# Patient Record
Sex: MALE | Race: WHITE | NOT HISPANIC OR LATINO
[De-identification: names, ages, dates, MRNs, and addresses within clinical notes are randomized per-mention and may not be internally consistent; named-entity substitution may affect disease eponyms.]

---

## 2017-12-30 ENCOUNTER — TRANSCRIPTION ENCOUNTER (OUTPATIENT)
Age: 77
End: 2017-12-30

## 2020-12-07 ENCOUNTER — TRANSCRIPTION ENCOUNTER (OUTPATIENT)
Age: 80
End: 2020-12-07

## 2023-07-07 PROBLEM — Z00.00 ENCOUNTER FOR PREVENTIVE HEALTH EXAMINATION: Status: ACTIVE | Noted: 2023-07-07

## 2023-07-10 ENCOUNTER — APPOINTMENT (OUTPATIENT)
Dept: NEUROLOGY | Facility: CLINIC | Age: 83
End: 2023-07-10
Payer: MEDICARE

## 2023-07-10 VITALS
DIASTOLIC BLOOD PRESSURE: 78 MMHG | SYSTOLIC BLOOD PRESSURE: 140 MMHG | HEIGHT: 68 IN | BODY MASS INDEX: 23.64 KG/M2 | HEART RATE: 67 BPM | WEIGHT: 156 LBS

## 2023-07-10 DIAGNOSIS — N40.0 BENIGN PROSTATIC HYPERPLASIA WITHOUT LOWER URINARY TRACT SYMPMS: ICD-10-CM

## 2023-07-10 DIAGNOSIS — M10.9 GOUT, UNSPECIFIED: ICD-10-CM

## 2023-07-10 DIAGNOSIS — K21.9 GASTRO-ESOPHAGEAL REFLUX DISEASE W/OUT ESOPHAGITIS: ICD-10-CM

## 2023-07-10 PROCEDURE — 99205 OFFICE O/P NEW HI 60 MIN: CPT

## 2023-07-10 RX ORDER — TAMSULOSIN HYDROCHLORIDE 0.4 MG/1
0.4 CAPSULE ORAL
Qty: 30 | Refills: 2 | Status: ACTIVE | COMMUNITY
Start: 2023-07-10

## 2023-07-10 RX ORDER — ALLOPURINOL 300 MG/1
300 TABLET ORAL DAILY
Refills: 0 | Status: ACTIVE | COMMUNITY
Start: 2023-07-10

## 2023-07-10 RX ORDER — FAMOTIDINE 20 MG/1
20 TABLET, FILM COATED ORAL DAILY
Refills: 0 | Status: ACTIVE | COMMUNITY
Start: 2023-07-10

## 2023-07-12 ENCOUNTER — OUTPATIENT (OUTPATIENT)
Dept: OUTPATIENT SERVICES | Facility: HOSPITAL | Age: 83
LOS: 1 days | End: 2023-07-12
Payer: MEDICARE

## 2023-07-12 ENCOUNTER — APPOINTMENT (OUTPATIENT)
Dept: MRI IMAGING | Facility: HOSPITAL | Age: 83
End: 2023-07-12
Payer: MEDICARE

## 2023-07-12 DIAGNOSIS — G31.84 MILD COGNITIVE IMPAIRMENT OF UNCERTAIN OR UNKNOWN ETIOLOGY: ICD-10-CM

## 2023-07-12 PROCEDURE — 70551 MRI BRAIN STEM W/O DYE: CPT | Mod: 26,MH

## 2023-07-12 PROCEDURE — 70551 MRI BRAIN STEM W/O DYE: CPT

## 2023-08-15 ENCOUNTER — APPOINTMENT (OUTPATIENT)
Dept: NEUROLOGY | Facility: CLINIC | Age: 83
End: 2023-08-15
Payer: MEDICARE

## 2023-08-15 PROCEDURE — 96136 PSYCL/NRPSYC TST PHY/QHP 1ST: CPT

## 2023-08-15 PROCEDURE — 96137 PSYCL/NRPSYC TST PHY/QHP EA: CPT

## 2023-08-15 PROCEDURE — 96116 NUBHVL XM PHYS/QHP 1ST HR: CPT

## 2023-08-15 PROCEDURE — 96133 NRPSYC TST EVAL PHYS/QHP EA: CPT

## 2023-08-15 PROCEDURE — 96132 NRPSYC TST EVAL PHYS/QHP 1ST: CPT

## 2023-09-05 ENCOUNTER — APPOINTMENT (OUTPATIENT)
Dept: NEUROLOGY | Facility: CLINIC | Age: 83
End: 2023-09-05
Payer: MEDICARE

## 2023-09-05 ENCOUNTER — APPOINTMENT (OUTPATIENT)
Dept: NEUROLOGY | Facility: CLINIC | Age: 83
End: 2023-09-05

## 2023-09-05 PROCEDURE — 96133 NRPSYC TST EVAL PHYS/QHP EA: CPT

## 2023-09-05 NOTE — HISTORY OF PRESENT ILLNESS
[FreeTextEntry1] : COVID - -mild, Paxlovid, AE of diarrhea, DC, got better any way.\par COVID VACCINE FULL.\par \par \par HPI: 83yo LH WM with BPH, gout, here for concerns of memory loss.\par \par PMH: For about 1y, he has had issues wiith STM, names, recent conversations, appts.\par Per wife, there is also some confusion, e.g. not fully know where and when to get things done. There may be some anxiety in some settings, contributing to the memory issue.\par \par -Memory: STM, names, events, appts\par -Speech: ok, no issues\par -Orientation: mostly ok, some sporadic difficulties\par -Praxis: mostly ok\par -Decision making/Executive fx/Multitasking: ok\par \par -Sleep:fair, a bit fragmented; some naps, can be long; no major snoring, deep breathing, no gasping\par \par -Appetite: ok\par \par -Motor symptoms: ok\par \par -B/B: frequency\par \par -Psychiatric symptoms: ok, but per wife, there can be some anxiety in certain settings, he tends to anticipate conversations\par \par -Functional status:\par [place] of [place] in Activities of Daily Living:\par 1. Bathing/Showerin\par 2. Dressin\par 3. Toiletin\par 4. Transferrin\par 5. Continence: 1\par 6: Feedin\par \par TOTAL: 6\par \par [place] Instrumental Activities of Daily Living:\par A. Ability to Use Telephone: 1\par B. Shoppin\par C. Food Preparation: 1\par D. Housekeepin\par E. Laundry: 1\par F. Transportation: 1\par G. Responsibility for Own Medications: 1\par H: Ability to Handle Finances: 1\par \par TOTAL: 8\par \par CDR: 0.5\par \par -Professional status: \par \par PCP and other physicians:\par -PCP:\par Vladislav Fernandez MD\par Internist in [place], [place]\par Get online care: [facility]\par Address: [address], [place] [address]\par Phone: [phone]\par \par Workup done:\par none.

## 2023-09-05 NOTE — PHYSICAL EXAM
[General Appearance - Alert] : alert [General Appearance - In No Acute Distress] : in no acute distress [Oriented To Time, Place, And Person] : oriented to person, place, and time [Impaired Insight] : insight and judgment were intact [Affect] : the affect was normal [Person] : oriented to person [Place] : oriented to place [Time] : oriented to time [Remote Intact] : remote memory intact [Registration Intact] : recent registration memory intact [Concentration Intact] : normal concentrating ability [Visual Intact] : visual attention was ~T not ~L decreased [Naming Objects] : no difficulty naming common objects [Repeating Phrases] : no difficulty repeating a phrase [Writing A Sentence] : no difficulty writing a sentence [Fluency] : fluency intact [Comprehension] : comprehension intact [Reading] : reading intact [Current Events] : adequate knowledge of current events [Past History] : adequate knowledge of personal past history [Vocabulary] : adequate range of vocabulary [Total Score ___ / 30] : the patient achieved a score of [unfilled] /30 [Date / Time ___ / 5] : date / time [unfilled] / 5 [Place ___ / 5] : place [unfilled] / 5 [Registration ___ / 3] : registration [unfilled] / 3 [Serial Sevens ___/5] : serial sevens [unfilled] / 5 [Naming 2 Objects ___ / 2] : naming two objects [unfilled] / 2 [Repeating a Sentence ___ / 1] : repeating a sentence [unfilled] / 1 [Writing a Sentence ___ / 1] : write sentence [unfilled] / 1 [3-stage Verbal Command ___ / 3] : three-stage verbal command [unfilled] / 3 [Written Command ___ / 1] : written command [unfilled] / 1 [Copy a Design ___ / 1] : copy a design [unfilled] / 1 [Recall ___ / 3] : recall [unfilled] / 3 [___ / 5] : Visuospatial / Executive: [unfilled] / 5 [0 / 0] : Memory: 0 / 0 [___ / 3] : Attention (Serial 7 subtraction): [unfilled] / 3 [___ / 1] : Fluency: [unfilled] / 1 [___ / 2] : Abstraction: [unfilled] / 2 [___ / 5] : Delayed Recall: [unfilled] / 5 [___ / 6] : Orientation: [unfilled] / 6 [Cranial Nerves Optic (II)] : visual acuity intact bilaterally,  visual fields full to confrontation, pupils equal round and reactive to light [Cranial Nerves Oculomotor (III)] : extraocular motion intact [Cranial Nerves Trigeminal (V)] : facial sensation intact symmetrically [Cranial Nerves Facial (VII)] : face symmetrical [Cranial Nerves Vestibulocochlear (VIII)] : hearing was intact bilaterally [Cranial Nerves Glossopharyngeal (IX)] : tongue and palate midline [Cranial Nerves Accessory (XI - Cranial And Spinal)] : head turning and shoulder shrug symmetric [Cranial Nerves Hypoglossal (XII)] : there was no tongue deviation with protrusion [Motor Tone] : muscle tone was normal in all four extremities [Motor Strength] : muscle strength was normal in all four extremities [Involuntary Movements] : no involuntary movements were seen [No Muscle Atrophy] : normal bulk in all four extremities [Motor Handedness Left-Handed] : the patient is left hand dominant [Sensation Tactile Decrease] : light touch was intact [Romberg's Sign] : a positive Romberg's sign was present [Balance] : balance was intact [2+] : Ankle jerk left 2+ [Sclera] : the sclera and conjunctiva were normal [PERRL With Normal Accommodation] : pupils were equal in size, round, reactive to light, with normal accommodation [Extraocular Movements] : extraocular movements were intact [No APD] : no afferent pupillary defect [No YELENA] : no internuclear ophthalmoplegia [Full Visual Field] : full visual field [Outer Ear] : the ears and nose were normal in appearance [Neck Appearance] : the appearance of the neck was normal [Edema] : there was no peripheral edema [Abnormal Walk] : normal gait [] : no rash [Motor Strength Upper Extremities Bilaterally] : strength was normal in both upper extremities [Motor Strength Lower Extremities Bilaterally] : strength was normal in both lower extremities [Limited Balance] : balance was intact [Past-pointing] : there was no past-pointing [Tremor] : no tremor present [Plantar Reflex Right Only] : normal on the right [Plantar Reflex Left Only] : normal on the left [FreeTextEntry4] : Mental Status Exam Presidents: 3/5 Alternating Pattern: ok Spiral: ok Clock: 3/3 Repetition: ok  R/L discrimination on self and examiner: ok Cross-line commands: ok Praxis: ok -Motor: ok -Dynamic/Luria: ok -Ideomotor/Imitation: ok  -Ideational/writing/closing-in: ok -Dressing:ok. [MocaTotal] : 28

## 2023-09-05 NOTE — ASSESSMENT
[FreeTextEntry1] : Assessment:\par 83yo RH WM with no significant PMH, here with forgetfulness.\par Mostly amnestic profile, MCI.\par Some anxiety, interfering with recall.\par \par Diagnostic Impression:\par -aMCI\par -anxiety\par \par Plan:\par Rule out reversible and vascular causes:\par -B vitamins, TFT, RPR\par -MRI brain w/o jessica\par -basic inflammatory labs\par -Lyme serology\par -NPT-Brief.\par Can consider SSRI in the near future as well.\par

## 2024-02-09 ENCOUNTER — NON-APPOINTMENT (OUTPATIENT)
Age: 84
End: 2024-02-09

## 2024-04-10 ENCOUNTER — INPATIENT (INPATIENT)
Facility: HOSPITAL | Age: 84
LOS: 1 days | Discharge: HOME | End: 2024-04-12
Attending: HOSPITALIST | Admitting: HOSPITALIST
Payer: MEDICARE

## 2024-04-10 VITALS
DIASTOLIC BLOOD PRESSURE: 56 MMHG | TEMPERATURE: 100 F | OXYGEN SATURATION: 100 % | SYSTOLIC BLOOD PRESSURE: 93 MMHG | RESPIRATION RATE: 18 BRPM | HEART RATE: 98 BPM

## 2024-04-10 DIAGNOSIS — M10.9 GOUT, UNSPECIFIED: ICD-10-CM

## 2024-04-10 DIAGNOSIS — Z87.438 PERSONAL HISTORY OF OTHER DISEASES OF MALE GENITAL ORGANS: ICD-10-CM

## 2024-04-10 DIAGNOSIS — R17 UNSPECIFIED JAUNDICE: ICD-10-CM

## 2024-04-10 DIAGNOSIS — R94.31 ABNORMAL ELECTROCARDIOGRAM [ECG] [EKG]: ICD-10-CM

## 2024-04-10 DIAGNOSIS — Z86.59 PERSONAL HISTORY OF OTHER MENTAL AND BEHAVIORAL DISORDERS: ICD-10-CM

## 2024-04-10 DIAGNOSIS — A41.9 SEPSIS, UNSPECIFIED ORGANISM: ICD-10-CM

## 2024-04-10 DIAGNOSIS — J18.9 PNEUMONIA, UNSPECIFIED ORGANISM: ICD-10-CM

## 2024-04-10 DIAGNOSIS — Z98.890 OTHER SPECIFIED POSTPROCEDURAL STATES: Chronic | ICD-10-CM

## 2024-04-10 DIAGNOSIS — R11.2 NAUSEA WITH VOMITING, UNSPECIFIED: ICD-10-CM

## 2024-04-10 DIAGNOSIS — Z29.9 ENCOUNTER FOR PROPHYLACTIC MEASURES, UNSPECIFIED: ICD-10-CM

## 2024-04-10 DIAGNOSIS — K92.0 HEMATEMESIS: ICD-10-CM

## 2024-04-10 LAB
ALBUMIN SERPL ELPH-MCNC: 3.6 G/DL — SIGNIFICANT CHANGE UP (ref 3.3–5)
ALP SERPL-CCNC: 67 U/L — SIGNIFICANT CHANGE UP (ref 40–120)
ALT FLD-CCNC: 19 U/L — SIGNIFICANT CHANGE UP (ref 4–41)
ANION GAP SERPL CALC-SCNC: 11 MMOL/L — SIGNIFICANT CHANGE UP (ref 7–14)
APPEARANCE UR: CLEAR — SIGNIFICANT CHANGE UP
APTT BLD: 24.2 SEC — LOW (ref 24.5–35.6)
AST SERPL-CCNC: 27 U/L — SIGNIFICANT CHANGE UP (ref 4–40)
BACTERIA # UR AUTO: NEGATIVE /HPF — SIGNIFICANT CHANGE UP
BASOPHILS # BLD AUTO: 0 K/UL — SIGNIFICANT CHANGE UP (ref 0–0.2)
BASOPHILS NFR BLD AUTO: 0 % — SIGNIFICANT CHANGE UP (ref 0–2)
BILIRUB SERPL-MCNC: 1.8 MG/DL — HIGH (ref 0.2–1.2)
BILIRUB UR-MCNC: NEGATIVE — SIGNIFICANT CHANGE UP
BLD GP AB SCN SERPL QL: NEGATIVE — SIGNIFICANT CHANGE UP
BLOOD GAS VENOUS COMPREHENSIVE RESULT: SIGNIFICANT CHANGE UP
BLOOD GAS VENOUS COMPREHENSIVE RESULT: SIGNIFICANT CHANGE UP
BUN SERPL-MCNC: 18 MG/DL — SIGNIFICANT CHANGE UP (ref 7–23)
CALCIUM SERPL-MCNC: 8.4 MG/DL — SIGNIFICANT CHANGE UP (ref 8.4–10.5)
CAST: 0 /LPF — SIGNIFICANT CHANGE UP (ref 0–4)
CHLORIDE SERPL-SCNC: 102 MMOL/L — SIGNIFICANT CHANGE UP (ref 98–107)
CO2 SERPL-SCNC: 21 MMOL/L — LOW (ref 22–31)
COLOR SPEC: YELLOW — SIGNIFICANT CHANGE UP
CREAT SERPL-MCNC: 1.14 MG/DL — SIGNIFICANT CHANGE UP (ref 0.5–1.3)
DIFF PNL FLD: NEGATIVE — SIGNIFICANT CHANGE UP
EGFR: 64 ML/MIN/1.73M2 — SIGNIFICANT CHANGE UP
EOSINOPHIL # BLD AUTO: 0 K/UL — SIGNIFICANT CHANGE UP (ref 0–0.5)
EOSINOPHIL NFR BLD AUTO: 0 % — SIGNIFICANT CHANGE UP (ref 0–6)
FLUAV AG NPH QL: SIGNIFICANT CHANGE UP
FLUBV AG NPH QL: SIGNIFICANT CHANGE UP
GLUCOSE SERPL-MCNC: 136 MG/DL — HIGH (ref 70–99)
GLUCOSE UR QL: NEGATIVE MG/DL — SIGNIFICANT CHANGE UP
HCT VFR BLD CALC: 39.9 % — SIGNIFICANT CHANGE UP (ref 39–50)
HGB BLD-MCNC: 13.7 G/DL — SIGNIFICANT CHANGE UP (ref 13–17)
IANC: 12.35 K/UL — HIGH (ref 1.8–7.4)
INR BLD: 1.14 RATIO — SIGNIFICANT CHANGE UP (ref 0.85–1.18)
KETONES UR-MCNC: ABNORMAL MG/DL
LEUKOCYTE ESTERASE UR-ACNC: NEGATIVE — SIGNIFICANT CHANGE UP
LIDOCAIN IGE QN: 19 U/L — SIGNIFICANT CHANGE UP (ref 7–60)
LYMPHOCYTES # BLD AUTO: 0.35 K/UL — LOW (ref 1–3.3)
LYMPHOCYTES # BLD AUTO: 2.6 % — LOW (ref 13–44)
MCHC RBC-ENTMCNC: 32.1 PG — SIGNIFICANT CHANGE UP (ref 27–34)
MCHC RBC-ENTMCNC: 34.3 GM/DL — SIGNIFICANT CHANGE UP (ref 32–36)
MCV RBC AUTO: 93.4 FL — SIGNIFICANT CHANGE UP (ref 80–100)
MONOCYTES # BLD AUTO: 1.18 K/UL — HIGH (ref 0–0.9)
MONOCYTES NFR BLD AUTO: 8.7 % — SIGNIFICANT CHANGE UP (ref 2–14)
NEUTROPHILS # BLD AUTO: 12.04 K/UL — HIGH (ref 1.8–7.4)
NEUTROPHILS NFR BLD AUTO: 79.1 % — HIGH (ref 43–77)
NITRITE UR-MCNC: NEGATIVE — SIGNIFICANT CHANGE UP
OB PNL STL: NEGATIVE — SIGNIFICANT CHANGE UP
PH UR: 6 — SIGNIFICANT CHANGE UP (ref 5–8)
PLATELET # BLD AUTO: 155 K/UL — SIGNIFICANT CHANGE UP (ref 150–400)
POTASSIUM SERPL-MCNC: 4.2 MMOL/L — SIGNIFICANT CHANGE UP (ref 3.5–5.3)
POTASSIUM SERPL-SCNC: 4.2 MMOL/L — SIGNIFICANT CHANGE UP (ref 3.5–5.3)
PROT SERPL-MCNC: 6.2 G/DL — SIGNIFICANT CHANGE UP (ref 6–8.3)
PROT UR-MCNC: 30 MG/DL
PROTHROM AB SERPL-ACNC: 12.8 SEC — SIGNIFICANT CHANGE UP (ref 9.5–13)
RBC # BLD: 4.27 M/UL — SIGNIFICANT CHANGE UP (ref 4.2–5.8)
RBC # FLD: 13 % — SIGNIFICANT CHANGE UP (ref 10.3–14.5)
RBC CASTS # UR COMP ASSIST: 3 /HPF — SIGNIFICANT CHANGE UP (ref 0–4)
RH IG SCN BLD-IMP: POSITIVE — SIGNIFICANT CHANGE UP
RSV RNA NPH QL NAA+NON-PROBE: SIGNIFICANT CHANGE UP
SARS-COV-2 RNA SPEC QL NAA+PROBE: SIGNIFICANT CHANGE UP
SODIUM SERPL-SCNC: 134 MMOL/L — LOW (ref 135–145)
SP GR SPEC: 1.09 — HIGH (ref 1–1.03)
SQUAMOUS # UR AUTO: 1 /HPF — SIGNIFICANT CHANGE UP (ref 0–5)
UROBILINOGEN FLD QL: 1 MG/DL — SIGNIFICANT CHANGE UP (ref 0.2–1)
WBC # BLD: 13.57 K/UL — HIGH (ref 3.8–10.5)
WBC # FLD AUTO: 13.57 K/UL — HIGH (ref 3.8–10.5)
WBC UR QL: 1 /HPF — SIGNIFICANT CHANGE UP (ref 0–5)

## 2024-04-10 PROCEDURE — 71045 X-RAY EXAM CHEST 1 VIEW: CPT | Mod: 26

## 2024-04-10 PROCEDURE — 99223 1ST HOSP IP/OBS HIGH 75: CPT

## 2024-04-10 PROCEDURE — 99285 EMERGENCY DEPT VISIT HI MDM: CPT | Mod: GC

## 2024-04-10 PROCEDURE — 74177 CT ABD & PELVIS W/CONTRAST: CPT | Mod: 26,MC

## 2024-04-10 RX ORDER — FAMOTIDINE 10 MG/ML
1 INJECTION INTRAVENOUS
Refills: 0 | DISCHARGE

## 2024-04-10 RX ORDER — PIPERACILLIN AND TAZOBACTAM 4; .5 G/20ML; G/20ML
3.38 INJECTION, POWDER, LYOPHILIZED, FOR SOLUTION INTRAVENOUS ONCE
Refills: 0 | Status: DISCONTINUED | OUTPATIENT
Start: 2024-04-11 | End: 2024-04-11

## 2024-04-10 RX ORDER — ALLOPURINOL 300 MG
0 TABLET ORAL
Refills: 0 | DISCHARGE

## 2024-04-10 RX ORDER — SODIUM CHLORIDE 9 MG/ML
1000 INJECTION INTRAMUSCULAR; INTRAVENOUS; SUBCUTANEOUS ONCE
Refills: 0 | Status: COMPLETED | OUTPATIENT
Start: 2024-04-10 | End: 2024-04-10

## 2024-04-10 RX ORDER — PIPERACILLIN AND TAZOBACTAM 4; .5 G/20ML; G/20ML
3.38 INJECTION, POWDER, LYOPHILIZED, FOR SOLUTION INTRAVENOUS ONCE
Refills: 0 | Status: COMPLETED | OUTPATIENT
Start: 2024-04-11 | End: 2024-04-11

## 2024-04-10 RX ORDER — PIPERACILLIN AND TAZOBACTAM 4; .5 G/20ML; G/20ML
3.38 INJECTION, POWDER, LYOPHILIZED, FOR SOLUTION INTRAVENOUS EVERY 8 HOURS
Refills: 0 | Status: DISCONTINUED | OUTPATIENT
Start: 2024-04-11 | End: 2024-04-11

## 2024-04-10 RX ORDER — PANTOPRAZOLE SODIUM 20 MG/1
40 TABLET, DELAYED RELEASE ORAL ONCE
Refills: 0 | Status: DISCONTINUED | OUTPATIENT
Start: 2024-04-10 | End: 2024-04-12

## 2024-04-10 RX ORDER — TAMSULOSIN HYDROCHLORIDE 0.4 MG/1
0.8 CAPSULE ORAL AT BEDTIME
Refills: 0 | Status: DISCONTINUED | OUTPATIENT
Start: 2024-04-10 | End: 2024-04-12

## 2024-04-10 RX ORDER — ACETAMINOPHEN 500 MG
975 TABLET ORAL ONCE
Refills: 0 | Status: COMPLETED | OUTPATIENT
Start: 2024-04-10 | End: 2024-04-10

## 2024-04-10 RX ORDER — PANTOPRAZOLE SODIUM 20 MG/1
40 TABLET, DELAYED RELEASE ORAL DAILY
Refills: 0 | Status: DISCONTINUED | OUTPATIENT
Start: 2024-04-11 | End: 2024-04-12

## 2024-04-10 RX ORDER — PIPERACILLIN AND TAZOBACTAM 4; .5 G/20ML; G/20ML
3.38 INJECTION, POWDER, LYOPHILIZED, FOR SOLUTION INTRAVENOUS EVERY 8 HOURS
Refills: 0 | Status: DISCONTINUED | OUTPATIENT
Start: 2024-04-10 | End: 2024-04-10

## 2024-04-10 RX ORDER — ACETAMINOPHEN 500 MG
650 TABLET ORAL EVERY 6 HOURS
Refills: 0 | Status: DISCONTINUED | OUTPATIENT
Start: 2024-04-10 | End: 2024-04-12

## 2024-04-10 RX ORDER — AMPICILLIN SODIUM AND SULBACTAM SODIUM 250; 125 MG/ML; MG/ML
3 INJECTION, POWDER, FOR SUSPENSION INTRAMUSCULAR; INTRAVENOUS ONCE
Refills: 0 | Status: COMPLETED | OUTPATIENT
Start: 2024-04-10 | End: 2024-04-10

## 2024-04-10 RX ORDER — PIPERACILLIN AND TAZOBACTAM 4; .5 G/20ML; G/20ML
3.38 INJECTION, POWDER, LYOPHILIZED, FOR SOLUTION INTRAVENOUS ONCE
Refills: 0 | Status: COMPLETED | OUTPATIENT
Start: 2024-04-10 | End: 2024-04-10

## 2024-04-10 RX ORDER — AZITHROMYCIN 500 MG/1
500 TABLET, FILM COATED ORAL ONCE
Refills: 0 | Status: COMPLETED | OUTPATIENT
Start: 2024-04-10 | End: 2024-04-10

## 2024-04-10 RX ORDER — ESCITALOPRAM OXALATE 10 MG/1
1 TABLET, FILM COATED ORAL
Refills: 0 | DISCHARGE

## 2024-04-10 RX ORDER — ALLOPURINOL 300 MG
100 TABLET ORAL AT BEDTIME
Refills: 0 | Status: DISCONTINUED | OUTPATIENT
Start: 2024-04-10 | End: 2024-04-12

## 2024-04-10 RX ADMIN — Medication 975 MILLIGRAM(S): at 16:20

## 2024-04-10 RX ADMIN — AMPICILLIN SODIUM AND SULBACTAM SODIUM 200 GRAM(S): 250; 125 INJECTION, POWDER, FOR SUSPENSION INTRAMUSCULAR; INTRAVENOUS at 16:50

## 2024-04-10 RX ADMIN — SODIUM CHLORIDE 1000 MILLILITER(S): 9 INJECTION INTRAMUSCULAR; INTRAVENOUS; SUBCUTANEOUS at 15:56

## 2024-04-10 RX ADMIN — SODIUM CHLORIDE 1000 MILLILITER(S): 9 INJECTION INTRAMUSCULAR; INTRAVENOUS; SUBCUTANEOUS at 14:18

## 2024-04-10 RX ADMIN — AZITHROMYCIN 255 MILLIGRAM(S): 500 TABLET, FILM COATED ORAL at 17:36

## 2024-04-10 RX ADMIN — Medication 975 MILLIGRAM(S): at 15:56

## 2024-04-10 NOTE — H&P ADULT - NSICDXPASTMEDICALHX_GEN_ALL_CORE_FT
PAST MEDICAL HISTORY:  Diverticulosis     Gout     History of BPH     History of IBS     Mild anxiety

## 2024-04-10 NOTE — ED PROVIDER NOTE - CLINICAL SUMMARY MEDICAL DECISION MAKING FREE TEXT BOX
Norm - Patient is an 83-year-old male with history of IBS who presents to the ED with abdominal pain, nausea, vomiting. ddx includes but is not limited to diverticulitis vs upper/lower GI bleed vs anemia. will check labs, ekg, CT abd pelv, cxr. fluids and tylenol for fever and pain

## 2024-04-10 NOTE — H&P ADULT - NSHPLABSRESULTS_GEN_ALL_CORE
Personally reviewed old records.  Personally reviewed labs.  Personally reviewed imaging.  Personally reviewed EKG.                          13.7   13.57 )-----------( 155      ( 10 Apr 2024 14:32 )             39.9       04-10    134<L>  |  102  |  18  ----------------------------<  136<H>  4.2   |  21<L>  |  1.14    Ca    8.4      10 Apr 2024 14:32    TPro  6.2  /  Alb  3.6  /  TBili  1.8<H>  /  DBili  x   /  AST  27  /  ALT  19  /  AlkPhos  67  04-10            LIVER FUNCTIONS - ( 10 Apr 2024 14:32 )  Alb: 3.6 g/dL / Pro: 6.2 g/dL / ALK PHOS: 67 U/L / ALT: 19 U/L / AST: 27 U/L / GGT: x             PT/INR - ( 10 Apr 2024 14:32 )   PT: 12.8 sec;   INR: 1.14 ratio         PTT - ( 10 Apr 2024 14:32 )  PTT:24.2 sec    Urinalysis Basic - ( 10 Apr 2024 14:32 )    Color: x / Appearance: x / SG: x / pH: x  Gluc: 136 mg/dL / Ketone: x  / Bili: x / Urobili: x   Blood: x / Protein: x / Nitrite: x   Leuk Esterase: x / RBC: x / WBC x   Sq Epi: x / Non Sq Epi: x / Bacteria: x .    -Personally INTERPRETED EKG:     -Personally INTERPRETED CXR: right basilar perihilar opacity, no PTX, no pleural effusions     -Personally reviewed the following labs below:                         13.7   13.57 )-----------( 155      ( 10 Apr 2024 14:32 )             39.9     04-10    134<L>  |  102  |  18  ----------------------------<  136<H>  4.2   |  21<L>  |  1.14    Ca    8.4      10 Apr 2024 14:32    TPro  6.2  /  Alb  3.6  /  TBili  1.8<H>  /  DBili  x   /  AST  27  /  ALT  19  /  AlkPhos  67  04-10      LIVER FUNCTIONS - ( 10 Apr 2024 14:32 )  Alb: 3.6 g/dL / Pro: 6.2 g/dL / ALK PHOS: 67 U/L / ALT: 19 U/L / AST: 27 U/L / GGT: x           PT/INR - ( 10 Apr 2024 14:32 )   PT: 12.8 sec;   INR: 1.14 ratio    PTT - ( 10 Apr 2024 14:32 )  PTT:24.2 sec    Urinalysis Basic - ( 10 Apr 2024 14:32 )  Color: x / Appearance: x / SG: x / pH: x  Gluc: 136 mg/dL / Ketone: x  / Bili: x / Urobili: x   Blood: x / Protein: x / Nitrite: x   Leuk Esterase: x / RBC: x / WBC x   Sq Epi: x / Non Sq Epi: x / Bacteria: x      -Personally reviewed: CT ABDOMEN AND PELVIS IC ORDERED BY: STELLA ENRIQUEZ    PROCEDURE DATE: 04/10/2024  FINDINGS:  LOWER CHEST: Right upper and middle lobe and left lower lobe heterogeneous infiltrates concerning for pneumonia. Trace left pleural effusion. Coronary artery calcification. Borderline cardiomegaly.    LIVER: Steatosis.  BILE DUCTS: Normal caliber.  GALLBLADDER: Within normal limits.  SPLEEN: Subcentimeter hypodense focus, indeterminate.  PANCREAS: Within normal limits.  ADRENALS: Within normal limits.  KIDNEYS/URETERS: Horseshoe kidney. No hydronephrosis.  BLADDER: Mural thickening.  REPRODUCTIVE ORGANS: Prostate is enlarged.  BOWEL: No bowel obstruction. Appendix is normal. Uncomplicated sigmoid diverticula.  PERITONEUM: No ascites. Small pockets of fluid in the pelvis bilaterally. Etiology undetermined.  VESSELS: Within normal limits.  RETROPERITONEUM/LYMPH NODES: No lymphadenopathy.  ABDOMINAL WALL: Lipoma right lateral abdominal wall, measuring 6.5 x 11.4 x 2 cm.  BONES: Degenerative changes.  IMPRESSION:  Bilateral pneumonia, right more than left.  Uncomplicated sigmoid diverticula.  Small foci of peritoneal fluid in the pelvic region, indeterminate.  Horseshoe kidney.  Enlarged prostate with chronic bladder outlet obstruction.  Additional findings as above. .    -Personally INTERPRETED EKG: NSR 86bpm, qtc 481, 1st deg AVB, no acute Tw or ST changes, no  PACs or PVCs     -Personally INTERPRETED CXR: right basilar perihilar opacity, no PTX, no pleural effusions     -Personally reviewed the following labs below:                         13.7   13.57 )-----------( 155      ( 10 Apr 2024 14:32 )             39.9     04-10    134<L>  |  102  |  18  ----------------------------<  136<H>  4.2   |  21<L>  |  1.14    Ca    8.4      10 Apr 2024 14:32    TPro  6.2  /  Alb  3.6  /  TBili  1.8<H>  /  DBili  x   /  AST  27  /  ALT  19  /  AlkPhos  67  04-10      LIVER FUNCTIONS - ( 10 Apr 2024 14:32 )  Alb: 3.6 g/dL / Pro: 6.2 g/dL / ALK PHOS: 67 U/L / ALT: 19 U/L / AST: 27 U/L / GGT: x           PT/INR - ( 10 Apr 2024 14:32 )   PT: 12.8 sec;   INR: 1.14 ratio    PTT - ( 10 Apr 2024 14:32 )  PTT:24.2 sec    Urinalysis Basic - ( 10 Apr 2024 14:32 )  Color: x / Appearance: x / SG: x / pH: x  Gluc: 136 mg/dL / Ketone: x  / Bili: x / Urobili: x   Blood: x / Protein: x / Nitrite: x   Leuk Esterase: x / RBC: x / WBC x   Sq Epi: x / Non Sq Epi: x / Bacteria: x      -Personally reviewed: CT ABDOMEN AND PELVIS IC ORDERED BY: STELLA ENRIQUEZ    PROCEDURE DATE: 04/10/2024  FINDINGS:  LOWER CHEST: Right upper and middle lobe and left lower lobe heterogeneous infiltrates concerning for pneumonia. Trace left pleural effusion. Coronary artery calcification. Borderline cardiomegaly.    LIVER: Steatosis.  BILE DUCTS: Normal caliber.  GALLBLADDER: Within normal limits.  SPLEEN: Subcentimeter hypodense focus, indeterminate.  PANCREAS: Within normal limits.  ADRENALS: Within normal limits.  KIDNEYS/URETERS: Horseshoe kidney. No hydronephrosis.  BLADDER: Mural thickening.  REPRODUCTIVE ORGANS: Prostate is enlarged.  BOWEL: No bowel obstruction. Appendix is normal. Uncomplicated sigmoid diverticula.  PERITONEUM: No ascites. Small pockets of fluid in the pelvis bilaterally. Etiology undetermined.  VESSELS: Within normal limits.  RETROPERITONEUM/LYMPH NODES: No lymphadenopathy.  ABDOMINAL WALL: Lipoma right lateral abdominal wall, measuring 6.5 x 11.4 x 2 cm.  BONES: Degenerative changes.  IMPRESSION:  Bilateral pneumonia, right more than left.  Uncomplicated sigmoid diverticula.  Small foci of peritoneal fluid in the pelvic region, indeterminate.  Horseshoe kidney.  Enlarged prostate with chronic bladder outlet obstruction.  Additional findings as above.

## 2024-04-10 NOTE — H&P ADULT - PROBLEM SELECTOR PLAN 8
diet: regular  dvt ppx: scds  dispo: pending clinical improvement    EKG with  QTc 505 with LBBB on EKG from admission, no prior EKG on record. Pt with no known cardiac hx. Pt denies chest pain, dyspnea, palpitations. Hemodynamically stable  -f/u repeat EKG  -avoid QT prolonging agents  -CTM LBBB on EKG from admission, QTc ~419. no prior EKG on record. Pt with no known cardiac hx. Pt denies chest pain, dyspnea, palpitations. Hemodynamically stable  -f/u repeat EKG  -CTM LBBB on EKG from admission, QTc ~419 (falsely elevated iso LBBB). no prior EKG on record. Pt with no known cardiac hx. Pt denies chest pain, dyspnea, palpitations. Hemodynamically stable  -f/u repeat EKG  -if LBBB present on repeat will assess cardiac enzymes Elevated Tbili 1.8 on admission with LFTs wnl. CTAP with liver steatosis. Possibly iatrogenic iso allopurinol use. No evidence of jaundice or other skin changes on exam  -trend cmp  -no plan for other intervention at this time will continue to monitor

## 2024-04-10 NOTE — H&P ADULT - PROBLEM SELECTOR PLAN 1
P/w  Met SIRS with leukocytosis with bands, fever and tachycardia, source of PNA meeting sepsis criteria. No significant imaging findings on CTAP    -f/u BCx, strep, legionella, flu/covid/rsv panel  -s/p Unasyn and Azithro in ED, c/w Zosyn (4/10 - ) P/w abd pain and N/V, now resolved. in ED met SIRS with leukocytosis with bands, fever and tachycardia, source of PNA thus meeting sepsis criteria. CXR/CT demonstrating multifocal pna otherwise no significant findings. Low suspicion for diverticulitis as pt abdomen nontender and no significant changes on CT  -UA and flu/covid/rsv negative  -f/u BCx, UCx, sputum cx, strep, legionella   -s/p Unasyn and Azithro in ED, c/w Zosyn (4/10 - ) and Doxycycline (4/11 - )  -trend cbc, monitor fever curve P/w abd pain and N/V, now resolved. in ED met SIRS with leukocytosis with bands, fever and tachycardia, source of PNA thus meeting sepsis criteria. CXR/CT demonstrating multifocal pna otherwise no significant findings. Low suspicion for diverticulitis or other abdominal source of infection as pt abdomen nontender and no significant changes on CT  -UA and flu/covid/rsv negative  -f/u BCx, UCx, sputum cx, strep, legionella   -s/p Unasyn and Azithro in ED, c/w Zosyn (4/10 - ) and Doxycycline (4/11 - )  -trend cbc, monitor fever curve P/w abd pain and N/V, now resolved. in ED met SIRS with leukocytosis with bands, fever and tachycardia, source of PNA thus meeting sepsis criteria. CXR/CT demonstrating multifocal pna otherwise no significant findings. Low suspicion for diverticulitis or other abdominal source of infection as pt abdomen nontender and no significant changes on CT  -UA and flu/covid/rsv negative  -f/u BCx, UCx, sputum cx, strep, legionella   -s/p Unasyn and Azithro in ED, c/w Zosyn (4/10 - ) and Azithro (4/10 - )  -trend cbc, monitor fever curve Xhrh=767.4, Tachycardia, UE=037i-75r; leukocytosis, WBC=13.57K  with bandemia 10%   Source: b/l PNA  CT A/P: Bilateral pneumonia, right more than left. Uncomplicated sigmoid diverticula. Small foci of peritoneal fluid in the pelvic region, indeterminate. Horseshoe kidney. Enlarged prostate with chronic bladder outlet obstruction.    -UA and flu/covid/rsv negative  -f/u BCx, UCx, sputum cx, strep, legionella   -s/p Unasyn and Azithro in ED  - Started Zosyn (4/10 - ) and Azithromycin IV (4/10 - )  -trend cbc, monitor fever curve

## 2024-04-10 NOTE — H&P ADULT - PROBLEM SELECTOR PLAN 4
diet: regular  dvt ppx:  dispo: pending clinical improvement  code: diet: regular  dvt ppx: scds  dispo: pending clinical improvement pt with hx of mild anxiety, on lexapro 10mg qd at home  -hold home lexapro iso prolonged qtc pt with hx of mild anxiety, on lexapro 10mg qd at home  -c/w home lexapro EKG: QTc 466-->481 after Azithromycin 1st dose      -Change Azithromycin IV --> Doxycycline IV due to QT prolongation   -Avoid QT prolonging agents

## 2024-04-10 NOTE — H&P ADULT - PROBLEM SELECTOR PLAN 5
CTAP demonstrating Enlarged prostate with chronic bladder outlet obstruction. Suspect pt's incontinence and frequency are iso known BPH. on tamsulosin 0.8mg qhs at home  -c/w home tamsulosin CTAP demonstrating Enlarged prostate with chronic bladder outlet obstruction. KIDNEYS/URETERS: Horseshoe kidney. No hydronephrosis. BLADDER: Mural thickening.     Suspect pt's incontinence and frequency are iso known BPH. on tamsulosin 0.8mg qhs at home    -c/w home Tamsulosin  -PVR in AM  ordered r/o retention   -f/u with PCP or  outpt pt with hx of mild anxiety, on lexapro 10mg qd at home  -c/w home lexapro pt with hx of mild anxiety, on lexapro 10mg qd at home  -hold lexapro iso qt prolongation

## 2024-04-10 NOTE — H&P ADULT - ATTENDING COMMENTS
83-year-old male with history of IBS, anxiety, gout a/w sepsis due to bilateral PNA, and abdominal pain with nausea, vomiting; r/o GIB, r/o urinary retention       Assessment and plan modified and supplemented where indicated;

## 2024-04-10 NOTE — ED ADULT NURSE REASSESSMENT NOTE - NS ED NURSE REASSESS COMMENT FT1
Report received from day RNStephanie. A&Ox4. Pt offers no complaints at this time, denies CP, palpitations, abdominal pain, SOB, nausea, vomiting, headache, lightheadedness, dizziness, fever or chills. Well appearing sitting up in stretcher doing a puzzle. HOB elevated. NSR on bedside cardiac monitor. Vital signs obtained and charted. Respirations even and unlabored with equal chest rise. No acute distress noted. Airway patent, speaking in full and complete sentences. Family at bedside. Bed in lowest position, in vision of nurses station in spot 3B, wheels locked, fall precautions in effect, safety maintained. Report given to [id number] RNKailey.

## 2024-04-10 NOTE — H&P ADULT - PROBLEM SELECTOR PLAN 2
-abx management per above  -labs per above  -Goal SpO2 >92%, currently on RA, can initiate supplemental O2 as needed CTAP with Bilateral pneumonia, right more than left.  -abx management per above  -labs per above  -Goal SpO2 >92%, currently on RA, can initiate supplemental O2 as needed CTAP with Bilateral pneumonia, right more than left.  C/F aspiration PNA    -Abx: Zosyn IV, Azithromycin IV   -f/u BCx, UCx, sputum cx, strep, legionella   -labs per above  -Goal SpO2 >92%, currently on RA, can initiate supplemental O2 as needed CTAP with Bilateral pneumonia, right more than left.  C/F aspiration PNA    -Abx: Zosyn IV, Azithromycin IV --> Doxycycline IV due to QT prolongation   -f/u BCx, UCx, sputum cx, strep, legionella   -labs per above  -Goal SpO2 >92%, currently on RA, can initiate supplemental O2 as needed

## 2024-04-10 NOTE — H&P ADULT - PROBLEM SELECTOR PLAN 3
-zofran q6 prn nausea Pt developed nausea with vomiting x5 this morning. Per records, pt noted to have "coffee-ground emesis" prior to coming to ED however pt and family state vomitus was brown. Denies black, bloody, or coffee ground appearance. His nausea has since resolved and he is no longer vomiting. Low suspicion for GIB at this time as hemodynamically stable, hgb stable, BUN wnl. Furthermore pt denies any melena or hematochezia.   -avoid zofran/reglan due to qtc prolongation  -c/w protonix 40mg qd  -monitor for further episodes of vomiting or signs of GIB  -if showing evidence for GIB would consult GI Pt developed nausea with vomiting x5 this morning. Per records, pt noted to have "coffee-ground emesis" prior to coming to ED however pt and family state vomitus was brown. Denies black, bloody, or coffee ground appearance. His nausea has since resolved and he is no longer vomiting. Low suspicion for GIB at this time as hemodynamically stable, hgb stable, BUN wnl. Furthermore pt denies any melena or hematochezia.   -zofran 4mg q6h prn nausea  -c/w protonix 40mg qd  -monitor for further episodes of vomiting or signs of GIB  -if showing evidence for GIB would consult GI Pt developed nausea with vomiting x5 this morning. Per records, pt noted to have "coffee-ground emesis" prior to coming to ED however pt and family state vomitus was brown. Denies black, bloody, or coffee ground appearance. His nausea has since resolved and he is no longer vomiting. Low suspicion for GIB at this time as hemodynamically stable, hgb stable, BUN wnl. Furthermore pt denies any melena or hematochezia.   -zofran 4mg q6h prn nausea  -c/w protonix 40mg qd  -monitor for further episodes of vomiting or signs of GIB  -if showing evidence for GIB would consult GI  -Hold Heparin sq for DVT ppx Pt developed nausea with vomiting x5 this morning. Per records, pt noted to have "coffee-ground emesis" prior to coming to ED however pt and family state vomitus was brown. Denies black, bloody, or coffee ground appearance. His nausea has since resolved and he is no longer vomiting. Low suspicion for GIB at this time as hemodynamically stable, hgb stable, BUN wnl. Furthermore pt denies any melena or hematochezia.   -would avoid zofran/reglan if possible due to qtc prolongation  -c/w protonix 40mg qd  -monitor for further episodes of vomiting or signs of GIB  -if showing evidence for GIB would consult GI  -Hold Heparin sq for DVT ppx

## 2024-04-10 NOTE — H&P ADULT - PROBLEM SELECTOR PLAN 9
diet: regular  dvt ppx: scds  dispo: pending clinical improvement LBBB on EKG from admission, QTc ~419 (falsely elevated iso LBBB). no prior EKG on record. Pt with no known cardiac hx. Pt denies chest pain, dyspnea, palpitations. Hemodynamically stable  -f/u repeat EKG  -if LBBB present on repeat will assess cardiac enzymes

## 2024-04-10 NOTE — ED ADULT NURSE NOTE - OBJECTIVE STATEMENT
Patient received to room 1 A&OX4, ambulatory at baseline, accompanied by family member coming to the ED brought by EMS for c/o multiple episodes having coffee ground emesis and diarrhea x 2 days. Patient endorsing generalized weakness an dizziness upon standing and quick movements. Endorsing mild nausea no vomiting at this time. Denies chest pain, sob, headache, blurry vision, abdominal pain. RR equal and unlabored. Placed on cardiac monitor. Abdomen soft, non-distended, tender on light palpation to the LLQ. Noted to be febrile at this time. Arrives with 18G IV placed by EMS to the L hand. 20G IV placed in the R AC, labs drawn and sent. Endorsing smoking marijuana occasionally and alcohol use x "few days a week". Care plan continued. Comfort measures provided. Safety maintained. Awaiting lab results and imaging.

## 2024-04-10 NOTE — H&P ADULT - PROBLEM SELECTOR PLAN 6
hx of gout flares, last flare years ago. on allopurinol qd at home  -c/w allopurinol 100mg qd  -monitor for sx CTAP demonstrating Enlarged prostate with chronic bladder outlet obstruction. KIDNEYS/URETERS: Horseshoe kidney. No hydronephrosis. BLADDER: Mural thickening.     Suspect pt's incontinence and frequency are iso known BPH. on tamsulosin 0.8mg qhs at home    -c/w home Tamsulosin  -PVR in AM  ordered r/o retention   -f/u with PCP or  outpt

## 2024-04-10 NOTE — H&P ADULT - NSHPREVIEWOFSYSTEMS_GEN_ALL_CORE
REVIEW OF SYSTEMS:  CONSTITUTIONAL: No weakness. No fevers. No chills.   EYES: No blurry vision. No eye pain.  ENT/NECK:No dysphagia. No sore throat. No Sinusitis/rhinorrhea.  CARDIAC: No chest pain. No palpitations. No lightheadedness.   RESPIRATORY: No cough. No SOB.  GASTROINTESTINAL: +abdominal pain. +nausea. +vomiting. No diarrhea. No constipation  GENITOURINARY: No dysuria. No frequency.   NEUROLOGICAL: No numbness/tingling. No focal weakness. No headache.  BACK: No back pain. No flank pain.  EXTREMITIES: No lower extremity edema. Full ROM. No joint pain.  SKIN: No rashes. No other lesions.  HEMATOLOGIC/LYMPHATIC: No easy bruising or bleeding. No swollen or tender lymph nodes.  All other systems reviewed and are negative unless indicated above. REVIEW OF SYSTEMS:  CONSTITUTIONAL: No weakness. No fevers. No chills. +fatigue  EYES: No blurry vision. No eye pain.  ENT/NECK:No dysphagia. No sore throat. No Sinusitis/rhinorrhea.  CARDIAC: No chest pain. No palpitations. No lightheadedness.   RESPIRATORY: No cough. No SOB.  GASTROINTESTINAL: +abdominal pain. +nausea. +vomiting. No diarrhea. No constipation  GENITOURINARY: No dysuria. +frequency.   NEUROLOGICAL: No numbness/tingling. No focal weakness. No headache.  BACK: No back pain. No flank pain.  EXTREMITIES: No lower extremity edema. Full ROM. No joint pain.  SKIN: No rashes. No other lesions.  HEMATOLOGIC/LYMPHATIC: No easy bruising or bleeding. No swollen or tender lymph nodes.  All other systems reviewed and are negative unless indicated above. REVIEW OF SYSTEMS:  CONSTITUTIONAL: No weakness. No fevers. No chills. +fatigue  EYES: No blurry vision. No eye pain.  ENT/NECK:No dysphagia. No sore throat. No Sinusitis/rhinorrhea.  CARDIAC: No chest pain. No palpitations. No lightheadedness.   RESPIRATORY: + cough. No SOB.  GASTROINTESTINAL: +abdominal pain. +nausea. +vomiting. No diarrhea. No constipation  GENITOURINARY: No dysuria. +frequency.   NEUROLOGICAL: No numbness/tingling. No focal weakness. No headache.  BACK: No back pain. No flank pain.  EXTREMITIES: No lower extremity edema. Full ROM. No joint pain.  SKIN: No rashes. No other lesions.  HEMATOLOGIC/LYMPHATIC: No easy bruising or bleeding. No swollen or tender lymph nodes.  All other systems reviewed and are negative unless indicated above.

## 2024-04-10 NOTE — ED PROVIDER NOTE - ATTENDING CONTRIBUTION TO CARE
Arnel: I have seen and examined the patient face to face, have reviewed and addended the HPI, PE and a/p as necessary.     84 yo M with IBS a/w abdominal pain, nausea, vomiting.  Noted to have coffee ground emesis today.  Reports pain is now in LLQ, now slightly better than earlier.  Pt reports no melena, no gross blood.  S/p 700 cc of fluid and Zofran 4 mg from EMS.     BP 90s/60s in room, with HR 90s upon eval.    GEN - NAD; well appearing; A+O x3; non-toxic appearing  CARD -s1s2, RRR, no M,G,R;   PULM - CTA b/l, symmetric breath sounds;   ABD -  +BS, TTP in LLQ, soft, no guarding, no rebound, no masses;   BACK - no CVA tenderness, Normal  spine;   EXT - symmetric pulses, 2+ dp, capillary refill < 2 seconds, no cyanosis, no edema;   NEURO - no focal neuro deficits, no slurred speech    84 yo M with IBS a/w abdominal pain, nausea, vomiting. LLQ ttp on exam, concern for intraabdom pathology like diverticulitis vs abscess, possibly viral in nature, will check cbc, cmp, guaiac, reassess.  CT a/p.  No gross bleeding, no active coffee ground emesis to warrant bleeding imaging at this time.  Will transfuse for hgb<7.  Reassess.  Offered pain meds, deferred initially.  Likely admit given hypotension and fever.

## 2024-04-10 NOTE — ED ADULT NURSE REASSESSMENT NOTE - BP NONINVASIVE DIASTOLIC (MM HG)
67 Airway patent, TM normal bilaterally, normal appearing mouth, nose, throat, neck supple with full range of motion, no cervical adenopathy.

## 2024-04-10 NOTE — ED PROVIDER NOTE - PHYSICAL EXAMINATION
Lori Zheng MD  GENERAL: Patient awake alert NAD.  HEENT: NC/AT, Moist mucous membranes, EOMI.  LUNGS: CTAB, no wheezes or crackles.   CARDIAC: RRR, no m/r/g.  BP 93 systolic, pt mentating well  ABDOMEN: Soft, +tender LLQ, ND, No rebound, guarding. No CVA tenderness.   : no rectal masses, no gross blood, no hemorrhoids   EXT: No edema. No calf tenderness.  MSK: No pain with movement, no deformities.  NEURO: A&Ox3. Moving all extremities.  SKIN: Warm and dry. No rash.  PSYCH: Normal affect.

## 2024-04-10 NOTE — ED ADULT NURSE NOTE - NSFALLRISKINTERV_ED_ALL_ED

## 2024-04-10 NOTE — H&P ADULT - HISTORY OF PRESENT ILLNESS
Patient is an 83-year-old male with history of IBS who presents to the ED with 1 day of abdominal pain, nausea, vomiting.  Patient states that symptoms started yesterday and have worsened overnight.  Pain was severe, left lower quadrant but now better. Patient got 700 cc of fluid and Zofran 4 mg from EMS.  Emesis x 5.  A couple episodes were described as coffee-ground.  Patient's last bowel movement was yesterday.  Was nonbloody, no melena. Denies fevers, chills, chest pain, dyspnea, cough, palpitations, dysuria, hematuria. No recent illness or sick contacts at home.    On arrival to ED, vitals were 101.4F, , /82, 100% on RA. Initial labs were WBC 13.6 w/ 9.6% bands, Hgb 13.7, Plt 155, Na 134, K 4.2, BUN/Cr 18/1.14, Tbili 1.8. Other labs unremarkable. CXR with hazy ill-defined opacity in the right basilar perihilar region, PNA vs atelectasis. CTAP showing Bilateral pneumonia, right more than left, Uncomplicated sigmoid diverticula, indeterminate small foci of peritoneal fluid in the pelvis, horseshoe kidney, and Enlarged prostate with chronic bladder outlet obstruction. Pt was given 2L IVF, Unasyn and Azithro in ED and admitted for further management. Patient is an 83-year-old male with history of IBS, anxiety, gout who presents to the ED with abdominal pain, nausea, vomiting. Pt was in usual state of health until yesterday when he began to feel fatigued. Overnight he developed non-radiating abdominal cramping most severe in LLQ which persisted into the morning. This morning also developed nausea with vomiting x5. Due to continued vomiting and inability to tolerate PO EMS was called. Per prior records he reportedly had "coffee-ground emesis" however pt and family describe vomitus as a darker brown but not black or bloody. Patient received 700 cc of fluid and Zofran 4 mg from EMS. On arriving to the ED his abdominal pain and N/V had resolved. He also reports several weeks of cough with clear sputum, as well as 1-2 weeks of increased urinary frequency with intermittent incontinence. Patient's last bowel movement was 2 days ago and was nonbloody, no melena. Denies fevers, chills, chest pain, dyspnea, cough, palpitations, hematochezia/melena, diarrhea, dysuria, hematuria. Has not eaten any new or unusual foods. No recent illness or sick contacts at home. Per wife, pt has some degree of altered cognition (increasingly forgetful) at baseline that is being evaluated as outpt    On arrival to ED, vitals were 101.4F, , /82, 100% on RA. Initial labs were WBC 13.6 w/ 9.6% bands, Hgb 13.7, Plt 155, Na 134, K 4.2, BUN/Cr 18/1.14, Tbili 1.8. Other labs unremarkable. CXR with hazy ill-defined opacity in the right basilar perihilar region, PNA vs atelectasis. CTAP showing Bilateral pneumonia, right more than left, Uncomplicated sigmoid diverticula, indeterminate small foci of peritoneal fluid in the pelvis, horseshoe kidney, and Enlarged prostate with chronic bladder outlet obstruction. Pt was given 2L IVF, Unasyn and Azithro in ED and admitted for further management. Patient is an 83-year-old male with history of IBS, anxiety, gout who presents to the ED with abdominal pain, nausea, vomiting. Pt was in usual state of health until yesterday when he began to feel fatigued. Overnight he developed non-radiating abdominal cramping most severe in LLQ which persisted into the morning. This morning also developed nausea with vomiting x5. Due to continued vomiting and inability to tolerate PO EMS was called. Pt reportedly had "coffee-ground emesis" however pt and family describe vomitus as a darker brown but not black or bloody, not consistent in appearance with coffee grounds. Patient received 700 cc of fluid and Zofran 4 mg from EMS. On arriving to the ED his abdominal pain and N/V had resolved. He also reports several weeks of cough with clear sputum, as well as 1-2 weeks of increased urinary frequency with intermittent incontinence. Patient's last bowel movement was 2 days ago and was nonbloody, no melena. Denies fevers, chills, chest pain, dyspnea, cough, palpitations, hematochezia/melena, diarrhea, dysuria, hematuria. Has not eaten any new or unusual foods. No recent illness or sick contacts at home. Per wife, pt has some degree of altered cognition (increasingly forgetful) at baseline that is being evaluated as outpt    On arrival to ED, vitals were 101.4F, , /82, 100% on RA. Initial labs were WBC 13.6 w/ 9.6% bands, Hgb 13.7, Plt 155, Na 134, K 4.2, BUN/Cr 18/1.14, Tbili 1.8. Other labs unremarkable. CXR with hazy ill-defined opacity in the right basilar perihilar region, PNA vs atelectasis. CTAP showing Bilateral pneumonia, right more than left, Uncomplicated sigmoid diverticula, indeterminate small foci of peritoneal fluid in the pelvis, horseshoe kidney, and Enlarged prostate with chronic bladder outlet obstruction. Pt was given 2L IVF, Unasyn and Azithro in ED and admitted for further management. 83-year-old male with history of IBS, anxiety, gout who presents to the ED with abdominal pain, nausea, vomiting. Pt was in usual state of health until yesterday when he began to feel fatigued. Overnight he developed non-radiating abdominal cramping most severe in LLQ which persisted into the morning. This morning also developed nausea with vomiting x5. Due to continued vomiting and inability to tolerate PO EMS was called. Pt reportedly had "coffee-ground emesis" however pt and family describe vomitus as a darker brown but not black or bloody, not consistent in appearance with coffee grounds. On arriving to the ED his abdominal pain and N/V had resolved. He also reports several weeks of cough with clear sputum, as well as 1-2 weeks of increased urinary frequency with intermittent incontinence. Patient's last bowel movement was 2 days ago and was nonbloody, no melena. Reports no fevers, chills, chest pain, dyspnea, cough, palpitations, hematochezia/melena, diarrhea, dysuria, hematuria. Has not eaten any new or unusual foods. No recent illness or sick contacts at home. Per wife, pt has some degree of altered cognition (increasingly forgetful) at baseline that is being evaluated as outpt. Patient received 700 cc of fluid and Zofran 4 mg from EMS.     ED course: 101.4F, , /82, 100% on RA; given 2L IVF, Unasyn and Azithro IV

## 2024-04-10 NOTE — H&P ADULT - NSHPPHYSICALEXAM_GEN_ALL_CORE
PHYSICAL EXAM:   GENERAL: Alert. Not confused. No acute distress.  HEAD:  Atraumatic. Normocephalic.  EYES: EOMI. PERRLA. Normal conjunctiva/sclera.  ENT: Neck supple. No JVD. Moist oral mucosa. Not edentulous. No thrush.  CARDIAC: Regular rate. Regular rhythm. S1. S2. No murmur. No rub. No distant heart sounds.  LUNG/CHEST: CTAB. BS equal bilaterally. No wheezes. No rales. No rhonchi.  ABDOMEN: +BS, TTP in LLQ, soft, no guarding, no rebound, no masses;   BACK: No midline/vertebral tenderness. No flank tenderness.  VASCULAR: +2 b/l radial or ulnar pulses. Palpable DP pulses.  EXTREMITIES:  No clubbing. No cyanosis. No edema. Moving all 4.  NEUROLOGY: A&Ox3. Non-focal exam. Cranial nerves intact. Normal speech. Sensation intact.  PSYCH: Normal behavior. Normal affect.  SKIN: No jaundice. No erythema. No rash/lesion.  ICU Vital Signs Last 24 Hrs  T(C): 38.1 (10 Apr 2024 16:20), Max: 38.6 (10 Apr 2024 14:00)  T(F): 100.6 (10 Apr 2024 16:20), Max: 101.4 (10 Apr 2024 14:00)  HR: 99 (10 Apr 2024 16:20) (98 - 102)  BP: 108/86 (10 Apr 2024 16:20) (93/56 - 108/86)  BP(mean): --  ABP: --  ABP(mean): --  RR: 18 (10 Apr 2024 16:20) (18 - 20)  SpO2: 99% (10 Apr 2024 16:20) (99% - 100%)    O2 Parameters below as of 10 Apr 2024 16:20  Patient On (Oxygen Delivery Method): room air          I&O's Summary PHYSICAL EXAM:   GENERAL: Alert  HEAD:  Atraumatic. Normocephalic.  EYES: EOMI. PERRLA. Normal conjunctiva/sclera.  ENT: Neck supple. Moist oral mucosa.   CARDIAC: Regular rate. Regular rhythm. S1. S2.   LUNG/CHEST: CTAB. BS equal bilaterally.   ABDOMEN: +BS, nontender, soft, no guarding, no rebound, no masses;   VASCULAR: +2 b/l radial or ulnar pulses. Palpable DP pulses.  EXTREMITIES:  No clubbing. No cyanosis. No edema. Moving all 4.  NEUROLOGY: A&Ox3. Non-focal exam. Cranial nerves intact.  PSYCH: Normal behavior. Normal affect.  SKIN: No jaundice. No erythema. No rash/lesion.  ICU Vital Signs Last 24 Hrs  T(C): 38.1 (10 Apr 2024 16:20), Max: 38.6 (10 Apr 2024 14:00)  T(F): 100.6 (10 Apr 2024 16:20), Max: 101.4 (10 Apr 2024 14:00)  HR: 99 (10 Apr 2024 16:20) (98 - 102)  BP: 108/86 (10 Apr 2024 16:20) (93/56 - 108/86)  BP(mean): --  ABP: --  ABP(mean): --  RR: 18 (10 Apr 2024 16:20) (18 - 20)  SpO2: 99% (10 Apr 2024 16:20) (99% - 100%)    O2 Parameters below as of 10 Apr 2024 16:20  Patient On (Oxygen Delivery Method): room air          I&O's Summary

## 2024-04-10 NOTE — H&P ADULT - NSHPSOCIALHISTORY_GEN_ALL_CORE
lives with wife at home. independent with ADLs. ambulatory without assistance lives with wife at home. independent with ADLs. ambulatory without assistance  retired

## 2024-04-10 NOTE — H&P ADULT - ASSESSMENT
Patient is an 83-year-old male with history of IBS, anxiety, gout who presents to the ED with abdominal pain, nausea, vomiting, admitted for sepsis iso multifocal PNA 83-year-old male with history of IBS, anxiety, gout a/w sepsis due to bilateral PNA, and abdominal pain with nausea, vomiting; r/o GIB, r/o urinary retention

## 2024-04-10 NOTE — ED PROVIDER NOTE - OBJECTIVE STATEMENT
Trope 100.  Discussed with Dr. Redd, who states that the patient's troponin is always high.  Patient's Trope patient's Trope has been in the 90s before but his last Trope in February was 47.  Patient is an 83-year-old male with history of IBS who presents to the ED with abdominal pain, nausea, vomiting.  Patient states that symptoms started yesterday and have worsened overnight.  Pain was severe, left lower quadrant but now better. Patient got 700 cc of fluid and Zofran 4 mg from EMS.  Emesis x 5.  A couple episodes were described as coffee-ground.  Patient's last bowel movement was yesterday.  Was nonbloody, no melena. Patient is an 83-year-old male with history of IBS who presents to the ED with abdominal pain, nausea, vomiting.  Patient states that symptoms started yesterday and have worsened overnight.  Pain was severe, left lower quadrant but now better. Patient got 700 cc of fluid and Zofran 4 mg from EMS.  Emesis x 5.  A couple episodes were described as coffee-ground.  Patient's last bowel movement was yesterday.  Was nonbloody, no melena.

## 2024-04-10 NOTE — ED ADULT NURSE NOTE - NS ED NOTE  TALK SOMEONE YN
Referred by: Rajat Crow MD; Medical Diagnosis (from order):    Diagnosis Information      Diagnosis    V45.89, V15.51 (ICD-9-CM) - Z98.890, Z87.81 (ICD-10-CM) - S/P ORIF (open reduction internal fixation) fracture                Daily Treatment Note -  Physical Therapy    Visit:  2     SUBJECTIVE                                                                                                             Pt reports pain in his hip and knee to be 1-2/10.       OBJECTIVE                                                                                                                        TREATMENT                                                                                                                  Therapeutic Exercise:  NU-STEP x 10 min  Standing BLE  abduction and toe raises x 20  Gastroc  stretch at stair master x 20.  Supine quad sets x 10  SLR X 20  FATOUMATA X 20  Quad sets x 10  Prone hip extension x 20  Ball squeezing x 10 with hold of 10  Pilates ring abduction x 10   Side stepping        ASSESSMENT                                                                                                             Pt reports no pain with ex, gait with RW with slow gait and WBAT. Requires v/c for posture.Will continue to work on strength and gait.        GOALS                                                                                                                           Decreased ROM LLE  Decreased strength LLE  Pain LLE  Need for AD  The above improvements in impairments to assist in obtaining goals listed below  Long Term Goals: to be met be end of plan of care  1. Patient will ambulate community distances with appropriate AD and 2/10 pain and with reported manageable/tolerable difficulty  Status: Progressing toward goal  2. Patient will ascend and descend with one hand rail, using reciprocal pattern and with pain not greater than 2/10 for tasks for independent living including able to get to his  bedroom/bathroom and work on projects I the basement Status: Progressing toward goal  3. Patient will stand for 30 minutes for tasks for showering and to work on projects in his basement Status: Progressing toward goal  4. Patient will be independent with progressed and modified home exercise program.  Status: Progressing toward goal  5. Lower Extremity Functional Scale: Patient will complete form to reflect an improved raw score to greater than or equal to 50/80 to indicate patient reported improvement in function/disability/impairment (minimal detectable change: 9 points). Status: Progressing toward goal  6. Increase LLE strength 1/2 to 1 grade to return to driving and I in tub transfers Status: Progressing toward goal       Procedures and total treatment time documented Time Entry flowsheet.   No

## 2024-04-10 NOTE — ED ADULT TRIAGE NOTE - CHIEF COMPLAINT QUOTE
From home, wife called, pt having coffee grind emesis and diarrhea x 2 days, EMS found patient hypotensive on scene 80's systolic, received about 700ml NS and 4mg zofran, unknown medical history, per EMS pt confused at baseline

## 2024-04-10 NOTE — H&P ADULT - PROBLEM SELECTOR PLAN 7
Elevated Tbili 1.8 on admission with LFTs wnl. CTAP with liver steatosis. Possibly iatrogenic iso allopurinol use. No evidence of jaundice or other skin changes on exam  -trend cmp  -no plan for other intervention at this time will continue to monitor hx of gout flares, last flare years ago. on allopurinol qd at home  -c/w allopurinol 100mg qd  -monitor for sx

## 2024-04-11 DIAGNOSIS — J18.9 PNEUMONIA, UNSPECIFIED ORGANISM: ICD-10-CM

## 2024-04-11 DIAGNOSIS — R94.31 ABNORMAL ELECTROCARDIOGRAM [ECG] [EKG]: ICD-10-CM

## 2024-04-11 LAB
A1C WITH ESTIMATED AVERAGE GLUCOSE RESULT: 5.2 % — SIGNIFICANT CHANGE UP (ref 4–5.6)
ANION GAP SERPL CALC-SCNC: 10 MMOL/L — SIGNIFICANT CHANGE UP (ref 7–14)
BUN SERPL-MCNC: 18 MG/DL — SIGNIFICANT CHANGE UP (ref 7–23)
CALCIUM SERPL-MCNC: 8.4 MG/DL — SIGNIFICANT CHANGE UP (ref 8.4–10.5)
CHLORIDE SERPL-SCNC: 102 MMOL/L — SIGNIFICANT CHANGE UP (ref 98–107)
CO2 SERPL-SCNC: 24 MMOL/L — SIGNIFICANT CHANGE UP (ref 22–31)
CREAT SERPL-MCNC: 1.27 MG/DL — SIGNIFICANT CHANGE UP (ref 0.5–1.3)
CULTURE RESULTS: NO GROWTH — SIGNIFICANT CHANGE UP
EGFR: 56 ML/MIN/1.73M2 — LOW
ESTIMATED AVERAGE GLUCOSE: 103 — SIGNIFICANT CHANGE UP
GLUCOSE SERPL-MCNC: 120 MG/DL — HIGH (ref 70–99)
HCT VFR BLD CALC: 34.2 % — LOW (ref 39–50)
HGB BLD-MCNC: 11.7 G/DL — LOW (ref 13–17)
LEGIONELLA AG UR QL: NEGATIVE — SIGNIFICANT CHANGE UP
MAGNESIUM SERPL-MCNC: 1.9 MG/DL — SIGNIFICANT CHANGE UP (ref 1.6–2.6)
MCHC RBC-ENTMCNC: 32.7 PG — SIGNIFICANT CHANGE UP (ref 27–34)
MCHC RBC-ENTMCNC: 34.2 GM/DL — SIGNIFICANT CHANGE UP (ref 32–36)
MCV RBC AUTO: 95.5 FL — SIGNIFICANT CHANGE UP (ref 80–100)
NRBC # BLD: 0 /100 WBCS — SIGNIFICANT CHANGE UP (ref 0–0)
NRBC # FLD: 0 K/UL — SIGNIFICANT CHANGE UP (ref 0–0)
PHOSPHATE SERPL-MCNC: 2.9 MG/DL — SIGNIFICANT CHANGE UP (ref 2.5–4.5)
PLATELET # BLD AUTO: 147 K/UL — LOW (ref 150–400)
POTASSIUM SERPL-MCNC: 4 MMOL/L — SIGNIFICANT CHANGE UP (ref 3.5–5.3)
POTASSIUM SERPL-SCNC: 4 MMOL/L — SIGNIFICANT CHANGE UP (ref 3.5–5.3)
RBC # BLD: 3.58 M/UL — LOW (ref 4.2–5.8)
RBC # FLD: 13 % — SIGNIFICANT CHANGE UP (ref 10.3–14.5)
S PNEUM AG UR QL: NEGATIVE — SIGNIFICANT CHANGE UP
SODIUM SERPL-SCNC: 136 MMOL/L — SIGNIFICANT CHANGE UP (ref 135–145)
SPECIMEN SOURCE: SIGNIFICANT CHANGE UP
WBC # BLD: 12.03 K/UL — HIGH (ref 3.8–10.5)
WBC # FLD AUTO: 12.03 K/UL — HIGH (ref 3.8–10.5)

## 2024-04-11 RX ORDER — AZITHROMYCIN 500 MG/1
500 TABLET, FILM COATED ORAL EVERY 24 HOURS
Refills: 0 | Status: DISCONTINUED | OUTPATIENT
Start: 2024-04-11 | End: 2024-04-12

## 2024-04-11 RX ORDER — AZITHROMYCIN 500 MG/1
500 TABLET, FILM COATED ORAL EVERY 24 HOURS
Refills: 0 | Status: DISCONTINUED | OUTPATIENT
Start: 2024-04-11 | End: 2024-04-11

## 2024-04-11 RX ORDER — AMPICILLIN SODIUM AND SULBACTAM SODIUM 250; 125 MG/ML; MG/ML
3 INJECTION, POWDER, FOR SUSPENSION INTRAMUSCULAR; INTRAVENOUS EVERY 6 HOURS
Refills: 0 | Status: DISCONTINUED | OUTPATIENT
Start: 2024-04-11 | End: 2024-04-12

## 2024-04-11 RX ORDER — ESCITALOPRAM OXALATE 10 MG/1
10 TABLET, FILM COATED ORAL AT BEDTIME
Refills: 0 | Status: DISCONTINUED | OUTPATIENT
Start: 2024-04-11 | End: 2024-04-11

## 2024-04-11 RX ADMIN — AMPICILLIN SODIUM AND SULBACTAM SODIUM 200 GRAM(S): 250; 125 INJECTION, POWDER, FOR SUSPENSION INTRAMUSCULAR; INTRAVENOUS at 14:24

## 2024-04-11 RX ADMIN — PIPERACILLIN AND TAZOBACTAM 200 GRAM(S): 4; .5 INJECTION, POWDER, LYOPHILIZED, FOR SOLUTION INTRAVENOUS at 01:56

## 2024-04-11 RX ADMIN — PANTOPRAZOLE SODIUM 40 MILLIGRAM(S): 20 TABLET, DELAYED RELEASE ORAL at 11:18

## 2024-04-11 RX ADMIN — TAMSULOSIN HYDROCHLORIDE 0.8 MILLIGRAM(S): 0.4 CAPSULE ORAL at 22:01

## 2024-04-11 RX ADMIN — AZITHROMYCIN 255 MILLIGRAM(S): 500 TABLET, FILM COATED ORAL at 17:04

## 2024-04-11 RX ADMIN — Medication 100 MILLIGRAM(S): at 22:01

## 2024-04-11 RX ADMIN — Medication 1 TABLET(S): at 11:17

## 2024-04-11 RX ADMIN — AMPICILLIN SODIUM AND SULBACTAM SODIUM 200 GRAM(S): 250; 125 INJECTION, POWDER, FOR SUSPENSION INTRAMUSCULAR; INTRAVENOUS at 23:03

## 2024-04-11 RX ADMIN — PIPERACILLIN AND TAZOBACTAM 25 GRAM(S): 4; .5 INJECTION, POWDER, LYOPHILIZED, FOR SOLUTION INTRAVENOUS at 08:10

## 2024-04-11 RX ADMIN — AMPICILLIN SODIUM AND SULBACTAM SODIUM 200 GRAM(S): 250; 125 INJECTION, POWDER, FOR SUSPENSION INTRAMUSCULAR; INTRAVENOUS at 18:19

## 2024-04-11 NOTE — PATIENT PROFILE ADULT - FALL HARM RISK - HARM RISK INTERVENTIONS
Assistance with ambulation/Assistance OOB with selected safe patient handling equipment/Communicate Risk of Fall with Harm to all staff/Discuss with provider need for PT consult/Monitor gait and stability/Provide patient with walking aids - walker, cane, crutches/Reinforce activity limits and safety measures with patient and family/Review medications for side effects contributing to fall risk/Sit up slowly, dangle for a short time, stand at bedside before walking/Tailored Fall Risk Interventions/Toileting schedule using arm’s reach rule for commode and bathroom/Visual Cue: Yellow wristband and red socks/Bed in lowest position, wheels locked, appropriate side rails in place/Call bell, personal items and telephone in reach/Instruct patient to call for assistance before getting out of bed or chair/Non-slip footwear when patient is out of bed/Lee to call system/Physically safe environment - no spills, clutter or unnecessary equipment/Purposeful Proactive Rounding/Room/bathroom lighting operational, light cord in reach

## 2024-04-11 NOTE — CONSULT NOTE ADULT - ASSESSMENT
84 y/o M PMhx IBS, gout who presented with abdominal pain, nausea, vomiting x 2 days    aspiration PNA vs aspiration pneumonitis  sepsis vs sirs (present on admission)  leukocytosis febrile to 101.4  SARS-CoV-2/ flu/ RSV PCR negative  urine legionella Ag negative  CT abd/ pelvis- Right upper and middle lobe and left lower lobe heterogeneous infiltrates concerning for pneumonia.     LLQ abd pain  UA negative, denies  symptoms  CT abd/pelvis- Small foci of peritoneal fluid in the pelvic region. Enlarged prostate with chronic bladder outlet obstruction.    Recommendations  switch zosyn to unasyn  will call lab to add on rest of RVP  - if chlamydia pneumoniae and mycoplasma pneumoniae are negative discontinue azithromycin  f/u blood cultures    Indra Milan M.D.  [facility], Division of Infectious Diseases  [phone]  After 5pm on weekdays and all day on weekends - please call [phone]

## 2024-04-11 NOTE — PATIENT PROFILE ADULT - MONEY FOR FOOD
[Carbohydrate Consistent Diet] : carbohydrate consistent diet [Hypoglycemia Management] : hypoglycemia management [Diabetes Foot Care] : diabetes foot care [Long Term Vascular Complications] : long term vascular complications of diabetes [Action and use of Insulin] : action and use of short and long-acting insulin [Insulin Self-Administration] : insulin self-administration [Self Monitoring of Blood Glucose] : self monitoring of blood glucose [FreeTextEntry1] : - pt declined retrying metformin at this time b/o fear GI problems\par - might retry tresiba once stomach issues resolve\par - toujeo 70 un hs, trulicity 1.5 qw, farxiga 10mg qd\par - keep off acarbose\par - refer for GES, and if (+), will stop trulicity. obtain the report of abd MRI\par - options for insulin pump/ sensor reviewed. different pumps discussed. pt will consider\par - obtain path report from Dr. Jalloh\par - synthroid  112 mcg \par - monitor ca/PTH\par - check lipid panel- can ry crestor 5mg biw, and if tolerates will slowly uptitrate. might require combo with zetia\par - f/u pulmonary (Dr. Lang)\par RTC  3 mos no

## 2024-04-11 NOTE — CONSULT NOTE ADULT - SUBJECTIVE AND OBJECTIVE BOX
[facility], Division of Infectious Diseases  RADHA Stearns S. Shah, Y. Patel, G. Bjorn  [phone]  ([phone] - weekdays after 5pm and weekends)    JACQUELINE SUN  83y, Male  [id number]    HPI--  HPI:  84 y/o M PMhx IBS, gout who presented with abdominal pain, nausea, vomiting. Reports diarrhea about 3 days ago which resolved followed by n/v x 2 days prior to admission. Also reports LLQ cramping abd pain. States he had not been eating due to this. States he has been around his grandchildren on Monday who had URI symptoms. Has not eaten any new or unusual foods.  He denies fever, chills, chest pain, SOB, dysuria. Has not eaten any new or unusual foods.   ED course: 101.4F, given IVF, Unasyn and Azithro IV then zosyn     ROS: 14 point review of systems completed, pertinent positives and negatives as per HPI.    Allergies: No Known Allergies    PMH -- Anxiety    Mild anxiety    Gout    Diverticulosis    History of IBS    History of BPH      PSH -- No significant past surgical history    H/O hernia repair      FH -- No pertinent family history in first degree relatives      Social History -- denies tobacco, alcohol or illicit drug use    Physical Exam--  Vital Signs Last 24 Hrs  T(F): 99 (11 Apr 2024 09:00), Max: 101.4 (10 Apr 2024 14:00)  HR: 71 (11 Apr 2024 09:00) (71 - 102)  BP: 105/61 (11 Apr 2024 09:00) (93/56 - 118/73)  RR: 18 (11 Apr 2024 09:00) (16 - 20)  SpO2: 100% (11 Apr 2024 09:00) (98% - 100%)  General: nontoxic-appearing, no acute distress  HEENT: anicteric  Lungs: bibasilar crackles  Heart: S1, S2, normal rate.   Abdomen: Soft. Nondistended. LLQ tenderness   Neuro: AAOx3, no obvious focal deficits   Back: No costovertebral angle tenderness.  Extremities: No LE edema.   Skin: Warm. Dry. No rash.  Psychiatric: Appropriate affect and mood for situation.     Laboratory & Imaging Data--  CBC:                       11.7   12.03 )-----------( 147      ( 11 Apr 2024 07:30 )             34.2     WBC Count: 12.03 K/uL (04-11-24 @ 07:30)  WBC Count: 13.57 K/uL (04-10-24 @ 14:32)    CMP: 04-11    136  |  102  |  18  ----------------------------<  120<H>  4.0   |  24  |  1.27    Ca    8.4      11 Apr 2024 07:30  Phos  2.9     04-11  Mg     1.90     04-11    TPro  6.2  /  Alb  3.6  /  TBili  1.8<H>  /  DBili  x   /  AST  27  /  ALT  19  /  AlkPhos  67  04-10    LIVER FUNCTIONS - ( 10 Apr 2024 14:32 )  Alb: 3.6 g/dL / Pro: 6.2 g/dL / ALK PHOS: 67 U/L / ALT: 19 U/L / AST: 27 U/L / GGT: x           Urinalysis Basic - ( 11 Apr 2024 07:30 )    Color: x / Appearance: x / SG: x / pH: x  Gluc: 120 mg/dL / Ketone: x  / Bili: x / Urobili: x   Blood: x / Protein: x / Nitrite: x   Leuk Esterase: x / RBC: x / WBC x   Sq Epi: x / Non Sq Epi: x / Bacteria: x      Microbiology:       Radiology--  ***  Active Medications--  acetaminophen     Tablet .. 650 milliGRAM(s) Oral every 6 hours PRN  allopurinol 100 milliGRAM(s) Oral at bedtime  azithromycin  IVPB 500 milliGRAM(s) IV Intermittent every 24 hours  multivitamin 1 Tablet(s) Oral daily  pantoprazole  Injectable 40 milliGRAM(s) IV Push once  pantoprazole  Injectable 40 milliGRAM(s) IV Push daily  piperacillin/tazobactam IVPB.. 3.375 Gram(s) IV Intermittent every 8 hours  tamsulosin 0.8 milliGRAM(s) Oral at bedtime    Antimicrobials:   azithromycin  IVPB 500 milliGRAM(s) IV Intermittent every 24 hours  piperacillin/tazobactam IVPB.. 3.375 Gram(s) IV Intermittent every 8 hours    Immunologic:

## 2024-04-12 ENCOUNTER — TRANSCRIPTION ENCOUNTER (OUTPATIENT)
Age: 84
End: 2024-04-12

## 2024-04-12 VITALS
RESPIRATION RATE: 18 BRPM | HEART RATE: 60 BPM | DIASTOLIC BLOOD PRESSURE: 73 MMHG | SYSTOLIC BLOOD PRESSURE: 124 MMHG | TEMPERATURE: 98 F | OXYGEN SATURATION: 99 %

## 2024-04-12 LAB
ANION GAP SERPL CALC-SCNC: 10 MMOL/L — SIGNIFICANT CHANGE UP (ref 7–14)
BUN SERPL-MCNC: 14 MG/DL — SIGNIFICANT CHANGE UP (ref 7–23)
CALCIUM SERPL-MCNC: 8.5 MG/DL — SIGNIFICANT CHANGE UP (ref 8.4–10.5)
CHLORIDE SERPL-SCNC: 102 MMOL/L — SIGNIFICANT CHANGE UP (ref 98–107)
CO2 SERPL-SCNC: 25 MMOL/L — SIGNIFICANT CHANGE UP (ref 22–31)
CREAT SERPL-MCNC: 1.08 MG/DL — SIGNIFICANT CHANGE UP (ref 0.5–1.3)
EGFR: 69 ML/MIN/1.73M2 — SIGNIFICANT CHANGE UP
GLUCOSE SERPL-MCNC: 100 MG/DL — HIGH (ref 70–99)
HCT VFR BLD CALC: 32.2 % — LOW (ref 39–50)
HGB BLD-MCNC: 11.1 G/DL — LOW (ref 13–17)
MCHC RBC-ENTMCNC: 32.3 PG — SIGNIFICANT CHANGE UP (ref 27–34)
MCHC RBC-ENTMCNC: 34.5 GM/DL — SIGNIFICANT CHANGE UP (ref 32–36)
MCV RBC AUTO: 93.6 FL — SIGNIFICANT CHANGE UP (ref 80–100)
NRBC # BLD: 0 /100 WBCS — SIGNIFICANT CHANGE UP (ref 0–0)
NRBC # FLD: 0 K/UL — SIGNIFICANT CHANGE UP (ref 0–0)
PLATELET # BLD AUTO: 129 K/UL — LOW (ref 150–400)
POTASSIUM SERPL-MCNC: 3.6 MMOL/L — SIGNIFICANT CHANGE UP (ref 3.5–5.3)
POTASSIUM SERPL-SCNC: 3.6 MMOL/L — SIGNIFICANT CHANGE UP (ref 3.5–5.3)
RBC # BLD: 3.44 M/UL — LOW (ref 4.2–5.8)
RBC # FLD: 13.1 % — SIGNIFICANT CHANGE UP (ref 10.3–14.5)
SODIUM SERPL-SCNC: 137 MMOL/L — SIGNIFICANT CHANGE UP (ref 135–145)
WBC # BLD: 7.72 K/UL — SIGNIFICANT CHANGE UP (ref 3.8–10.5)
WBC # FLD AUTO: 7.72 K/UL — SIGNIFICANT CHANGE UP (ref 3.8–10.5)

## 2024-04-12 RX ORDER — TAMSULOSIN HYDROCHLORIDE 0.4 MG/1
2 CAPSULE ORAL
Qty: 60 | Refills: 0
Start: 2024-04-12 | End: 2024-05-11

## 2024-04-12 RX ORDER — ACETAMINOPHEN 500 MG
2 TABLET ORAL
Qty: 0 | Refills: 0 | DISCHARGE
Start: 2024-04-12

## 2024-04-12 RX ORDER — ESCITALOPRAM OXALATE 10 MG/1
10 TABLET, FILM COATED ORAL DAILY
Refills: 0 | Status: DISCONTINUED | OUTPATIENT
Start: 2024-04-12 | End: 2024-04-12

## 2024-04-12 RX ORDER — TAMSULOSIN HYDROCHLORIDE 0.4 MG/1
2 CAPSULE ORAL
Refills: 0 | DISCHARGE

## 2024-04-12 RX ADMIN — AMPICILLIN SODIUM AND SULBACTAM SODIUM 200 GRAM(S): 250; 125 INJECTION, POWDER, FOR SUSPENSION INTRAMUSCULAR; INTRAVENOUS at 05:58

## 2024-04-12 RX ADMIN — AMPICILLIN SODIUM AND SULBACTAM SODIUM 200 GRAM(S): 250; 125 INJECTION, POWDER, FOR SUSPENSION INTRAMUSCULAR; INTRAVENOUS at 12:54

## 2024-04-12 RX ADMIN — ESCITALOPRAM OXALATE 10 MILLIGRAM(S): 10 TABLET, FILM COATED ORAL at 12:51

## 2024-04-12 RX ADMIN — PANTOPRAZOLE SODIUM 40 MILLIGRAM(S): 20 TABLET, DELAYED RELEASE ORAL at 12:50

## 2024-04-12 RX ADMIN — Medication 1 TABLET(S): at 12:51

## 2024-04-12 NOTE — DISCHARGE NOTE PROVIDER - NSDCMRMEDTOKEN_GEN_ALL_CORE_FT
acetaminophen 325 mg oral tablet: 2 tab(s) orally every 6 hours As needed Temp greater or equal to 38C (100.4F), Mild Pain (1 - 3)  allopurinol 100 mg oral tablet: orally  amoxicillin-clavulanate 875 mg-125 mg oral tablet: 875 milligram(s) orally 2 times a day  Lexapro 10 mg oral tablet: 1 tab(s) orally once a day  Multiple Vitamins oral tablet: 1 tab(s) orally once a day  Pepcid 20 mg oral tablet: 1 tab(s) orally once a day  tamsulosin 0.4 mg oral capsule: 2 cap(s) orally once a day (at bedtime)

## 2024-04-12 NOTE — PROGRESS NOTE ADULT - NSPROGADDITIONALINFOA_GEN_ALL_CORE
dc planning if remain stable. monitor for recurrent diarrhea.   d/w pt and the wife.  d/w NP Zita.     - Dr. PRITCHARD Htet ([facility])  - [phone]
RVP full panel pending.  ID eval appreciated.  abx regimen per ID.     will evalute tomorrow and if stable dc planning.    d/w ID.     - Dr. FRANCHESKA Tavares ([facility])  - [phone]

## 2024-04-12 NOTE — DISCHARGE NOTE PROVIDER - NSFOLLOWUPCLINICS_GEN_ALL_ED_FT
[facility]  [address] [place] [address], [place] [address]  Phone: [phone]  Fax:

## 2024-04-12 NOTE — DISCHARGE NOTE PROVIDER - HOSPITAL COURSE
83-year-old male with history of IBS, anxiety, gout a/w sepsis due to bilateral PNA, and abdominal pain with nausea, vomiting; r/o GIB, r/o urinary retention        Problem/Plan - 1:  ·  Problem: Sepsis.   ·  Plan: Vedr=511.4, Tachycardia, US=294i-03r; leukocytosis, WBC=13.57K  with bandemia 10%   Source: b/l PNA  CT A/P: Bilateral pneumonia, right more than left. Uncomplicated sigmoid diverticula. Small foci of peritoneal fluid in the pelvic region, indeterminate. Horseshoe kidney. Enlarged prostate with chronic bladder outlet obstruction.  -Afebrile today, leukocytosis wnl     - UA and flu/covid/rsv negative  - BCx, UCx both prelimp neg.  sputum cx, strep, legionella Ag neg.   - s/p Unasyn and Azithro in ED  - trend cbc, monitor fever curve, resolved leukocytosis.   - abx per ID, on IV Unasyn. Plan to  transition to PO Augmentin on discharge.     Problem/Plan - 2:  ·  Problem: Bilateral pneumonia.   ·  Plan: CTAP with Bilateral pneumonia, right more than left vs. aspiration pneumonitis.  (episode of vomiting prior to admission)  -c/w Abx as above.   feeling better.  tolerating diet.  no history of dysphagia.       Problem/Plan - 3:  ·  Problem: Coffee ground emesis.   ·  Plan: Pt developed nausea with vomiting x5 this morning. Per records, pt noted to have "coffee-ground emesis" prior to coming to ED however pt and family state vomitus was brown. Denies black, bloody, or coffee ground appearance. His nausea has since resolved and he is no longer vomiting. Low suspicion for GIB at this time as hemodynamically stable, hgb stable, BUN wnl. Furthermore pt denies any melena or hematochezia.   -would avoid zofran/reglan if possible due to qtc prolongation (qtc ~ 460)  -c/w protonix 40mg qd  -monitor for further episodes of vomiting or signs of GIB  -if showing evidence for GIB would consult GI  -Tolerating diet , no n/v      Problem/Plan - 4:  ·  Problem: QT prolongation.   ·  Plan: EKG: QTc 466-->481 after Azithromycin 1st dose  -Avoid QT prolonging agents.     Problem/Plan - 5:  ·  Problem: History of anxiety.   ·  Plan: pt with hx of mild anxiety, on Lexapro 10mg qd at home  Qtc < 500 stable, okay to resume.     Problem/Plan - 6:  ·  Problem: History of BPH.   ·  Plan: CTAP demonstrating Enlarged prostate with chronic bladder outlet obstruction. KIDNEYS/URETERS: Horseshoe kidney. No hydronephrosis. BLADDER: Mural thickening.     Suspect pt's incontinence and frequency are iso known BPH. on tamsulosin 0.8mg qhs at home    -c/w home Tamsulosin  - monitor PVR if decreased urine output  - f/u outpt advised.     Problem/Plan - 7:  ·  Problem: Gout.   ·  Plan: hx of gout flares, last flare years ago. on allopurinol qd at home  -c/w allopurinol 100mg qd  -monitor for sx.     Problem/Plan - 8:  ·  Problem: Elevated bilirubin.   ·  Plan: Elevated Tbili 1.8 on admission with LFTs wnl. CTAP with liver steatosis. Possibly iatrogenic iso allopurinol use. No evidence of jaundice or other skin changes on exam  -trend cmp  -no plan for other intervention at this time will continue to monitor.     Problem/Plan - 9:  ·  Problem: Need for prophylactic measure.   ·  Plan: diet: regular  dvt ppx: scds  dispo: pending clinical improvement.  Pt seen by Dr. Tavares this am and cleared for discharge -pt tolerating diet no diarrhea   Dispo: home

## 2024-04-12 NOTE — PROGRESS NOTE ADULT - PROBLEM SELECTOR PLAN 2
CTAP with Bilateral pneumonia, right more than left.  C/F aspiration PNA    -Abx: as above.   -f/u BCx, UCx, sputum cx, strep, legionella   -labs per above  -Goal SpO2 >92%, currently on RA, can initiate supplemental O2 as needed
CTAP with Bilateral pneumonia, right more than left vs. aspiration pneumonitis.  (episode of vomiting prior to admission)  -c/w Abx as above.   feeling better.  tolerating diet.  no history of dysphagia.

## 2024-04-12 NOTE — PROGRESS NOTE ADULT - PROBLEM SELECTOR PLAN 4
EKG: QTc 466-->481 after Azithromycin 1st dose  -Avoid QT prolonging agents
EKG: QTc 466-->481 after Azithromycin 1st dose    -Change Azithromycin IV --> Doxycycline IV due to QT prolongation   -Avoid QT prolonging agents

## 2024-04-12 NOTE — PROGRESS NOTE ADULT - PROBLEM SELECTOR PLAN 7
hx of gout flares, last flare years ago. on allopurinol qd at home  -c/w allopurinol 100mg qd  -monitor for sx
hx of gout flares, last flare years ago. on allopurinol qd at home  -c/w allopurinol 100mg qd  -monitor for sx

## 2024-04-12 NOTE — PROGRESS NOTE ADULT - PROBLEM SELECTOR PLAN 9
diet: regular  dvt ppx: scds  dispo: pending clinical improvement
diet: regular  dvt ppx: scds  dispo: pending clinical improvement

## 2024-04-12 NOTE — PROGRESS NOTE ADULT - PROBLEM SELECTOR PLAN 1
Jzge=617.4, Tachycardia, PT=402y-56d; leukocytosis, WBC=13.57K  with bandemia 10%   Source: b/l PNA  CT A/P: Bilateral pneumonia, right more than left. Uncomplicated sigmoid diverticula. Small foci of peritoneal fluid in the pelvic region, indeterminate. Horseshoe kidney. Enlarged prostate with chronic bladder outlet obstruction.    - UA and flu/covid/rsv negative  - BCx, UCx both prelimp neg.  sputum cx, strep, legionella Ag neg.   - s/p Unasyn and Azithro in ED  - trend cbc, monitor fever curve, resolved leukocytosis.   - abx per ID, on IV Unasyn. Plan to  transition to PO Augmentin on discharge.
Ufzw=477.4, Tachycardia, ZJ=706a-87h; leukocytosis, WBC=13.57K  with bandemia 10%   Source: b/l PNA  CT A/P: Bilateral pneumonia, right more than left. Uncomplicated sigmoid diverticula. Small foci of peritoneal fluid in the pelvic region, indeterminate. Horseshoe kidney. Enlarged prostate with chronic bladder outlet obstruction.    - UA and flu/covid/rsv negative  - f/u BCx, UCx, sputum cx, strep, legionella Ag neg.   - s/p Unasyn and Azithro in ED  - Started Zosyn (4/10 - ) and Azithromycin IV (4/10 - )  - trend cbc, monitor fever curve  - abx per ID

## 2024-04-12 NOTE — PROGRESS NOTE ADULT - SUBJECTIVE AND OBJECTIVE BOX
[facility], Division of Infectious Diseases  RADHA Stearns Y. Patel, S. Shah, G. Bjorn  [phone]  ([phone] - weekdays after 5pm and weekends)    Name: JACQUELINE SUN  Age/Gender: 83y Male  MRN: [id number]    Interval History:  Notes reviewed.   No concerning overnight events.  Afebrile.   feels good  wants to go home today    Allergies: No Known Allergies      Objective:  Vitals:   T(F): 98.4 (04-12-24 @ 05:30), Max: 99 (04-11-24 @ 09:00)  HR: 60 (04-12-24 @ 05:30) (60 - 76)  BP: 138/72 (04-12-24 @ 05:30) (104/62 - 145/77)  RR: 18 (04-12-24 @ 05:30) (18 - 19)  SpO2: 94% (04-12-24 @ 05:30) (94% - 100%)  Physical Examination:  General: no acute distress  HEENT: anicteric  Cardio: S1, S2, normal rate  Resp: clear to auscultation bilaterally  Abd: soft, NT, ND  Ext: no LE edema  Skin: warm, dry    Laboratory Studies:  CBC:                       11.1   7.72  )-----------( 129      ( 12 Apr 2024 05:25 )             32.2     WBC Trend:  7.72 04-12-24 @ 05:25  12.03 04-11-24 @ 07:30  13.57 04-10-24 @ 14:32    CMP: 04-12    137  |  102  |  14  ----------------------------<  100<H>  3.6   |  25  |  1.08    Ca    8.5      12 Apr 2024 05:25  Phos  2.9     04-11  Mg     1.90     04-11    TPro  6.2  /  Alb  3.6  /  TBili  1.8<H>  /  DBili  x   /  AST  27  /  ALT  19  /  AlkPhos  67  04-10      LIVER FUNCTIONS - ( 10 Apr 2024 14:32 )  Alb: 3.6 g/dL / Pro: 6.2 g/dL / ALK PHOS: 67 U/L / ALT: 19 U/L / AST: 27 U/L / GGT: x             Urinalysis Basic - ( 12 Apr 2024 05:25 )    Color: x / Appearance: x / SG: x / pH: x  Gluc: 100 mg/dL / Ketone: x  / Bili: x / Urobili: x   Blood: x / Protein: x / Nitrite: x   Leuk Esterase: x / RBC: x / WBC x   Sq Epi: x / Non Sq Epi: x / Bacteria: x      Microbiology: reviewed     Culture - Urine (collected 04-10-24 @ 21:22)  Source: Clean Catch Clean Catch (Midstream)  Final Report (04-11-24 @ 22:44):    No growth    Culture - Blood (collected 04-10-24 @ 16:00)  Source: .Blood Blood-Peripheral  Preliminary Report (04-11-24 @ 19:02):    No growth at 24 hours    Culture - Blood (collected 04-10-24 @ 15:50)  Source: .Blood Blood-Venous  Preliminary Report (04-11-24 @ 19:02):    No growth at 24 hours        Radiology: reviewed     Medications:  acetaminophen     Tablet .. 650 milliGRAM(s) Oral every 6 hours PRN  allopurinol 100 milliGRAM(s) Oral at bedtime  ampicillin/sulbactam  IVPB 3 Gram(s) IV Intermittent every 6 hours  multivitamin 1 Tablet(s) Oral daily  pantoprazole  Injectable 40 milliGRAM(s) IV Push once  pantoprazole  Injectable 40 milliGRAM(s) IV Push daily  tamsulosin 0.8 milliGRAM(s) Oral at bedtime    Antimicrobials:  ampicillin/sulbactam  IVPB 3 Gram(s) IV Intermittent every 6 hours  
SUBJECTIVE/ OVERNIGHT EVENTS:  comfortable  afebrile now  cough better  no cp, no sob, no n/v/d. no abdominal pain.  no headache, no dizziness.   states no difficulty swallowing.       --------------------------------------------------------------------------------------------  LABS:                        11.7   12.03 )-----------( 147      ( 11 Apr 2024 07:30 )             34.2     04-11    136  |  102  |  18  ----------------------------<  120<H>  4.0   |  24  |  1.27    Ca    8.4      11 Apr 2024 07:30  Phos  2.9     04-11  Mg     1.90     04-11    TPro  6.2  /  Alb  3.6  /  TBili  1.8<H>  /  DBili  x   /  AST  27  /  ALT  19  /  AlkPhos  67  04-10    PT/INR - ( 10 Apr 2024 14:32 )   PT: 12.8 sec;   INR: 1.14 ratio         PTT - ( 10 Apr 2024 14:32 )  PTT:24.2 sec  CAPILLARY BLOOD GLUCOSE      POCT Blood Glucose.: 116 mg/dL (10 Apr 2024 16:45)        Urinalysis Basic - ( 11 Apr 2024 07:30 )    Color: x / Appearance: x / SG: x / pH: x  Gluc: 120 mg/dL / Ketone: x  / Bili: x / Urobili: x   Blood: x / Protein: x / Nitrite: x   Leuk Esterase: x / RBC: x / WBC x   Sq Epi: x / Non Sq Epi: x / Bacteria: x        RADIOLOGY & ADDITIONAL TESTS:    Imaging Personally Reviewed:  [x] YES  [ ] NO    Consultant(s) Notes Reviewed:  [x] YES  [ ] NO    MEDICATIONS  (STANDING):  allopurinol 100 milliGRAM(s) Oral at bedtime  azithromycin  IVPB 500 milliGRAM(s) IV Intermittent every 24 hours  multivitamin 1 Tablet(s) Oral daily  pantoprazole  Injectable 40 milliGRAM(s) IV Push once  pantoprazole  Injectable 40 milliGRAM(s) IV Push daily  piperacillin/tazobactam IVPB.. 3.375 Gram(s) IV Intermittent every 8 hours  tamsulosin 0.8 milliGRAM(s) Oral at bedtime    MEDICATIONS  (PRN):  acetaminophen     Tablet .. 650 milliGRAM(s) Oral every 6 hours PRN Temp greater or equal to 38C (100.4F), Mild Pain (1 - 3)      Care Discussed with Consultants/Other Providers [x] YES  [ ] NO    Vital Signs Last 24 Hrs  T(C): 37.2 (11 Apr 2024 09:00), Max: 38.6 (10 Apr 2024 14:00)  T(F): 99 (11 Apr 2024 09:00), Max: 101.4 (10 Apr 2024 14:00)  HR: 71 (11 Apr 2024 09:00) (71 - 102)  BP: 105/61 (11 Apr 2024 09:00) (93/56 - 118/73)  BP(mean): 64 (10 Apr 2024 22:32) (64 - 64)  RR: 18 (11 Apr 2024 09:00) (16 - 20)  SpO2: 100% (11 Apr 2024 09:00) (98% - 100%)    Parameters below as of 11 Apr 2024 09:00  Patient On (Oxygen Delivery Method): room air      I&O's Summary      PHYSICAL EXAM:  GENERAL: NAD, comfortable on room air  HEAD:  Atraumatic, Normocephalic  EYES: EOMI, PERRLA, conjunctiva and sclera clear  NECK: Supple, No JVD  CHEST/LUNG: mild decrease breath sounds bilaterally; No wheeze   HEART: Regular rate and rhythm; No murmurs, rubs, or gallops  ABDOMEN: Soft, Nontender, Nondistended; Bowel sounds present  Neuro: AAOx3, no focal weakness   EXTREMITIES:  2+ Peripheral Pulses, No clubbing, cyanosis, or edema  SKIN: No rashes or lesions   
SUBJECTIVE/ OVERNIGHT EVENTS:  feels well  1 episode of loose stool this am  no cp, no sob, no n/v/d. no abdominal pain.  no headache, no dizziness.   wbc now normalized.   afebrile.      --------------------------------------------------------------------------------------------  LABS:                        11.1   7.72  )-----------( 129      ( 12 Apr 2024 05:25 )             32.2     04-12    137  |  102  |  14  ----------------------------<  100<H>  3.6   |  25  |  1.08    Ca    8.5      12 Apr 2024 05:25  Phos  2.9     04-11  Mg     1.90     04-11    TPro  6.2  /  Alb  3.6  /  TBili  1.8<H>  /  DBili  x   /  AST  27  /  ALT  19  /  AlkPhos  67  04-10    PT/INR - ( 10 Apr 2024 14:32 )   PT: 12.8 sec;   INR: 1.14 ratio         PTT - ( 10 Apr 2024 14:32 )  PTT:24.2 sec  CAPILLARY BLOOD GLUCOSE            Urinalysis Basic - ( 12 Apr 2024 05:25 )    Color: x / Appearance: x / SG: x / pH: x  Gluc: 100 mg/dL / Ketone: x  / Bili: x / Urobili: x   Blood: x / Protein: x / Nitrite: x   Leuk Esterase: x / RBC: x / WBC x   Sq Epi: x / Non Sq Epi: x / Bacteria: x        RADIOLOGY & ADDITIONAL TESTS:    Imaging Personally Reviewed:  [x] YES  [ ] NO    Consultant(s) Notes Reviewed:  [x] YES  [ ] NO    MEDICATIONS  (STANDING):  allopurinol 100 milliGRAM(s) Oral at bedtime  ampicillin/sulbactam  IVPB 3 Gram(s) IV Intermittent every 6 hours  multivitamin 1 Tablet(s) Oral daily  pantoprazole  Injectable 40 milliGRAM(s) IV Push once  pantoprazole  Injectable 40 milliGRAM(s) IV Push daily  tamsulosin 0.8 milliGRAM(s) Oral at bedtime    MEDICATIONS  (PRN):  acetaminophen     Tablet .. 650 milliGRAM(s) Oral every 6 hours PRN Temp greater or equal to 38C (100.4F), Mild Pain (1 - 3)      Care Discussed with Consultants/Other Providers [x] YES  [ ] NO    Vital Signs Last 24 Hrs  T(C): 36.7 (12 Apr 2024 09:30), Max: 37.2 (11 Apr 2024 14:11)  T(F): 98 (12 Apr 2024 09:30), Max: 99 (11 Apr 2024 14:11)  HR: 68 (12 Apr 2024 09:30) (60 - 76)  BP: 112/69 (12 Apr 2024 09:30) (104/62 - 145/77)  BP(mean): --  RR: 18 (12 Apr 2024 09:30) (18 - 19)  SpO2: 96% (12 Apr 2024 09:30) (94% - 100%)    Parameters below as of 12 Apr 2024 09:30  Patient On (Oxygen Delivery Method): room air      I&O's Summary          PHYSICAL EXAM:  GENERAL: NAD, comfortable on room air  HEAD:  Atraumatic, Normocephalic  EYES: EOMI, PERRLA, conjunctiva and sclera clear  NECK: Supple, No JVD  CHEST/LUNG: mild decrease breath sounds bilaterally; No wheeze   HEART: Regular rate and rhythm; No murmurs, rubs, or gallops  ABDOMEN: Soft, Nontender, Nondistended; Bowel sounds present  Neuro: AAOx3, no focal weakness   EXTREMITIES:  2+ Peripheral Pulses, No clubbing, cyanosis, or edema  SKIN: No rashes or lesions

## 2024-04-12 NOTE — PROGRESS NOTE ADULT - PROBLEM SELECTOR PLAN 3
Pt developed nausea with vomiting x5 this morning. Per records, pt noted to have "coffee-ground emesis" prior to coming to ED however pt and family state vomitus was brown. Denies black, bloody, or coffee ground appearance. His nausea has since resolved and he is no longer vomiting. Low suspicion for GIB at this time as hemodynamically stable, hgb stable, BUN wnl. Furthermore pt denies any melena or hematochezia.   -would avoid zofran/reglan if possible due to qtc prolongation (qtc ~ 460)  -c/w protonix 40mg qd  -monitor for further episodes of vomiting or signs of GIB  -if showing evidence for GIB would consult GI  -Hold Heparin sq for DVT ppx
Pt developed nausea with vomiting x5 this morning. Per records, pt noted to have "coffee-ground emesis" prior to coming to ED however pt and family state vomitus was brown. Denies black, bloody, or coffee ground appearance. His nausea has since resolved and he is no longer vomiting. Low suspicion for GIB at this time as hemodynamically stable, hgb stable, BUN wnl. Furthermore pt denies any melena or hematochezia.   -would avoid zofran/reglan if possible due to qtc prolongation  -c/w protonix 40mg qd  -monitor for further episodes of vomiting or signs of GIB  -if showing evidence for GIB would consult GI  -Hold Heparin sq for DVT ppx

## 2024-04-12 NOTE — DISCHARGE NOTE PROVIDER - NSDCCPCAREPLAN_GEN_ALL_CORE_FT
PRINCIPAL DISCHARGE DIAGNOSIS  Diagnosis: Pneumonia  Assessment and Plan of Treatment: A course of antibiotics was initiated during your hospital stay - please continue as prescribed. Monitor for worsening of disease, such as, increased cough/sputum, difficulty breathing, fever/chills, or changes in mental status. Follow-up with your PCP as an outpatient for further medical care/recommendations.        SECONDARY DISCHARGE DIAGNOSES  Diagnosis: Coffee ground emesis  Assessment and Plan of Treatment: Unsure if s.s of blood in vomitus blood counts stable   Continue pepcid   Follow up  with Dr. Fernandez for any further GI symptoms such as vomiting , dark stools    Diagnosis: History of anxiety  Assessment and Plan of Treatment: Continue Lexapro    Diagnosis: Gout  Assessment and Plan of Treatment: Continue allopurinol    Diagnosis: History of BPH  Assessment and Plan of Treatment: Flomax increased to 0.8mg   Follow up with urology - # for [facility] provided on discharge instructions for further monitoring

## 2024-04-12 NOTE — DISCHARGE NOTE NURSING/CASE MANAGEMENT/SOCIAL WORK - PATIENT PORTAL LINK FT
You can access the FollowMyHealth Patient Portal offered by [facility] by registering at the following website: [email or web address]. By joining [employer]’s FollowMyHealth portal, you will also be able to view your health information using other applications (apps) compatible with our system.

## 2024-04-12 NOTE — PROGRESS NOTE ADULT - PROBLEM SELECTOR PLAN 6
CTAP demonstrating Enlarged prostate with chronic bladder outlet obstruction. KIDNEYS/URETERS: Horseshoe kidney. No hydronephrosis. BLADDER: Mural thickening.     Suspect pt's incontinence and frequency are iso known BPH. on tamsulosin 0.8mg qhs at home    -c/w home Tamsulosin  - monitor PVR if decreased urine output  - f/u outpt advised.
CTAP demonstrating Enlarged prostate with chronic bladder outlet obstruction. KIDNEYS/URETERS: Horseshoe kidney. No hydronephrosis. BLADDER: Mural thickening.     Suspect pt's incontinence and frequency are iso known BPH. on tamsulosin 0.8mg qhs at home    -c/w home Tamsulosin  -PVR in AM  ordered r/o retention   -f/u with PCP or  outpt

## 2024-04-12 NOTE — DISCHARGE NOTE PROVIDER - NSDCFUSCHEDAPPT_GEN_ALL_CORE_FT
[facility]  NEUROLOGY [facility] [place]  Scheduled Appointment: 04/30/2024

## 2024-04-12 NOTE — PROGRESS NOTE ADULT - ASSESSMENT
84 y/o M PMhx IBS, gout who presented with abdominal pain, nausea, vomiting x 2 days    aspiration PNA  sepsis- resolved  leukocytosis, fever- resolved  SARS-CoV-2/ flu/ RSV PCR negative  urine legionella Ag negative  CT abd/ pelvis- Right upper and middle lobe and left lower lobe heterogeneous infiltrates concerning for pneumonia.     LLQ abd pain- resolved  UA negative, denies  symptoms  CT abd/pelvis- Small foci of peritoneal fluid in the pelvic region. Enlarged prostate with chronic bladder outlet obstruction.    Recommendations  urine legionella Ag and RVP negative, discontinue azithromycin  f/u blood cultures- NGTD  on discharge transition to augmentin 875mg bid w/ last day 4/15    Indra Milan M.D.  [facility], Division of Infectious Diseases  [phone]  After 5pm on weekdays and all day on weekends - please call [phone] 
83-year-old male with history of IBS, anxiety, gout a/w sepsis due to bilateral PNA, and abdominal pain with nausea, vomiting; r/o GIB, r/o urinary retention 
83-year-old male with history of IBS, anxiety, gout a/w sepsis due to bilateral PNA, and abdominal pain with nausea, vomiting; r/o GIB, r/o urinary retention

## 2024-04-12 NOTE — PROGRESS NOTE ADULT - PROBLEM SELECTOR PLAN 8
Elevated Tbili 1.8 on admission with LFTs wnl. CTAP with liver steatosis. Possibly iatrogenic iso allopurinol use. No evidence of jaundice or other skin changes on exam  -trend cmp  -no plan for other intervention at this time will continue to monitor
Elevated Tbili 1.8 on admission with LFTs wnl. CTAP with liver steatosis. Possibly iatrogenic iso allopurinol use. No evidence of jaundice or other skin changes on exam  -trend cmp  -no plan for other intervention at this time will continue to monitor

## 2024-04-12 NOTE — PROGRESS NOTE ADULT - PROBLEM SELECTOR PLAN 5
pt with hx of mild anxiety, on Lexapro 10mg qd at home  -hold Lexapro iso qt prolongation
pt with hx of mild anxiety, on Lexapro 10mg qd at home  Qtc < 500 stable, okay to resume.

## 2024-04-13 ENCOUNTER — TRANSCRIPTION ENCOUNTER (OUTPATIENT)
Age: 84
End: 2024-04-13

## 2024-04-15 ENCOUNTER — TRANSCRIPTION ENCOUNTER (OUTPATIENT)
Age: 84
End: 2024-04-15

## 2024-04-15 LAB
CULTURE RESULTS: SIGNIFICANT CHANGE UP
CULTURE RESULTS: SIGNIFICANT CHANGE UP
SPECIMEN SOURCE: SIGNIFICANT CHANGE UP
SPECIMEN SOURCE: SIGNIFICANT CHANGE UP

## 2024-04-23 ENCOUNTER — TRANSCRIPTION ENCOUNTER (OUTPATIENT)
Age: 84
End: 2024-04-23

## 2024-04-30 ENCOUNTER — APPOINTMENT (OUTPATIENT)
Dept: NEUROLOGY | Facility: CLINIC | Age: 84
End: 2024-04-30
Payer: MEDICARE

## 2024-04-30 VITALS
SYSTOLIC BLOOD PRESSURE: 120 MMHG | HEIGHT: 68 IN | BODY MASS INDEX: 23.04 KG/M2 | WEIGHT: 152 LBS | DIASTOLIC BLOOD PRESSURE: 57 MMHG | HEART RATE: 60 BPM

## 2024-04-30 DIAGNOSIS — G31.84 MILD COGNITIVE IMPAIRMENT, SO STATED: ICD-10-CM

## 2024-04-30 DIAGNOSIS — F41.9 ANXIETY DISORDER, UNSPECIFIED: ICD-10-CM

## 2024-04-30 PROCEDURE — 99214 OFFICE O/P EST MOD 30 MIN: CPT

## 2024-04-30 RX ORDER — ESCITALOPRAM OXALATE 10 MG/1
10 TABLET ORAL
Qty: 30 | Refills: 3 | Status: ACTIVE | COMMUNITY
Start: 2023-09-05 | End: 1900-01-01

## 2024-04-30 NOTE — HISTORY OF PRESENT ILLNESS
[FreeTextEntry1] : COVID - -mild, Paxlovid, AE of diarrhea, DC, got better any way. COVID VACCINE FULL.  [id number]: Since last seen: -NPT c/w aMCI, as assessed by me -continues to forget names and tasks -recent PNA, admitted, recovered -appetite ok -sleep mostly ok -motor ok, fell skiing1-2mo ago, getting better; at times feels uneven -ADL ok, IADL mostly ok, some glitches Re to missing appts.   HPI: 83yo LH WM with BPH, gout, here for concerns of memory loss. PMH: For about 1y, he has had issues with STM, names, recent conversations, appts. Per wife, there is also some confusion, e.g. not fully know where and when to get things done. There may be some anxiety in some settings, contributing to the memory issue.  -Memory: STM, names, events, appts -Speech: ok, no issues -Orientation: mostly ok, some sporadic difficulties -Praxis: mostly ok -Decision making/Executive fx/Multitasking: ok  -Sleep:fair, a bit fragmented; some naps, can be long; no major snoring, deep breathing, no gasping  -Appetite: ok  -Motor symptoms: ok  -B/B: frequency  -Psychiatric symptoms: ok, but per wife, there can be some anxiety in certain settings, he tends to anticipate conversations  -Functional status: [place] of [place] in Activities of Daily Livin. Bathing/Showerin 2. Dressin 3. Toiletin 4. Transferrin 5. Continence: 1 6: Feedin  TOTAL: 6  Adonis-[place]ok Instrumental Activities of Daily Living: A. Ability to Use Telephone: 1 B. Shoppin C. Food Preparation: 1 D. Housekeepin E. Laundry: 1 F. Transportation: 1 G. Responsibility for Own Medications: 1 H: Ability to Handle Finances: 1  TOTAL: 8  CDR: 0.5  -Professional status:   PCP and other physicians: -PCP: Vladislav Fernandez MD Internist in [place], [place] Get online care: [employer] Address: [address] [address] [address], [place] [address] Phone: [phone]  Workup done: none.

## 2024-04-30 NOTE — PHYSICAL EXAM
[General Appearance - Alert] : alert [General Appearance - In No Acute Distress] : in no acute distress [Oriented To Time, Place, And Person] : oriented to person, place, and time [Impaired Insight] : insight and judgment were intact [Affect] : the affect was normal [Person] : oriented to person [Place] : oriented to place [Time] : oriented to time [Remote Intact] : remote memory intact [Registration Intact] : recent registration memory intact [Concentration Intact] : normal concentrating ability [Visual Intact] : visual attention was ~T not ~L decreased [Naming Objects] : no difficulty naming common objects [Repeating Phrases] : no difficulty repeating a phrase [Writing A Sentence] : no difficulty writing a sentence [Fluency] : fluency intact [Comprehension] : comprehension intact [Reading] : reading intact [Current Events] : adequate knowledge of current events [Past History] : adequate knowledge of personal past history [Vocabulary] : adequate range of vocabulary [Total Score ___ / 30] : the patient achieved a score of [unfilled] /30 [Date / Time ___ / 5] : date / time [unfilled] / 5 [Place ___ / 5] : place [unfilled] / 5 [Registration ___ / 3] : registration [unfilled] / 3 [Serial Sevens ___/5] : serial sevens [unfilled] / 5 [Naming 2 Objects ___ / 2] : naming two objects [unfilled] / 2 [Repeating a Sentence ___ / 1] : repeating a sentence [unfilled] / 1 [Writing a Sentence ___ / 1] : write sentence [unfilled] / 1 [3-stage Verbal Command ___ / 3] : three-stage verbal command [unfilled] / 3 [Written Command ___ / 1] : written command [unfilled] / 1 [Copy a Design ___ / 1] : copy a design [unfilled] / 1 [Recall ___ / 3] : recall [unfilled] / 3 [___ / 5] : Visuospatial / Executive: [unfilled] / 5 [0 / 0] : Memory: 0 / 0 [___ / 3] : Attention (Serial 7 subtraction): [unfilled] / 3 [___ / 1] : Fluency: [unfilled] / 1 [___ / 2] : Abstraction: [unfilled] / 2 [___ / 5] : Delayed Recall: [unfilled] / 5 [___ / 6] : Orientation: [unfilled] / 6 [Cranial Nerves Optic (II)] : visual acuity intact bilaterally,  visual fields full to confrontation, pupils equal round and reactive to light [Cranial Nerves Oculomotor (III)] : extraocular motion intact [Cranial Nerves Trigeminal (V)] : facial sensation intact symmetrically [Cranial Nerves Facial (VII)] : face symmetrical [Cranial Nerves Vestibulocochlear (VIII)] : hearing was intact bilaterally [Cranial Nerves Glossopharyngeal (IX)] : tongue and palate midline [Cranial Nerves Accessory (XI - Cranial And Spinal)] : head turning and shoulder shrug symmetric [Cranial Nerves Hypoglossal (XII)] : there was no tongue deviation with protrusion [Motor Tone] : muscle tone was normal in all four extremities [Motor Strength] : muscle strength was normal in all four extremities [Involuntary Movements] : no involuntary movements were seen [No Muscle Atrophy] : normal bulk in all four extremities [Motor Handedness Left-Handed] : the patient is left hand dominant [Sensation Tactile Decrease] : light touch was intact [Romberg's Sign] : a positive Romberg's sign was present [Balance] : balance was intact [2+] : Ankle jerk left 2+ [Sclera] : the sclera and conjunctiva were normal [PERRL With Normal Accommodation] : pupils were equal in size, round, reactive to light, with normal accommodation [Extraocular Movements] : extraocular movements were intact [No APD] : no afferent pupillary defect [No YELENA] : no internuclear ophthalmoplegia [Full Visual Field] : full visual field [Outer Ear] : the ears and nose were normal in appearance [Neck Appearance] : the appearance of the neck was normal [Edema] : there was no peripheral edema [Abnormal Walk] : normal gait [] : no rash [FreeTextEntry1] : [id number]: Exam substantially stable, doing quite well. [Short Term Intact] : short term memory impaired [Motor Strength Upper Extremities Bilaterally] : strength was normal in both upper extremities [Motor Strength Lower Extremities Bilaterally] : strength was normal in both lower extremities [Limited Balance] : balance was intact [Past-pointing] : there was no past-pointing [Tremor] : no tremor present [Plantar Reflex Right Only] : normal on the right [Plantar Reflex Left Only] : normal on the left [FreeTextEntry4] : Mental Status Exam Presidents: 3/5 Alternating Pattern: ok Spiral: ok Clock: 3/3 Repetition: ok  R/L discrimination on self and examiner: ok Cross-line commands: ok Praxis: ok -Motor: ok -Dynamic/Luria: ok -Ideomotor/Imitation: ok  -Ideational/writing/closing-in: ok -Dressing:ok. [MocaTotal] : 28

## 2024-05-07 PROBLEM — F41.9 ANXIETY DISORDER, UNSPECIFIED: Chronic | Status: ACTIVE | Noted: 2024-04-10

## 2024-05-07 PROBLEM — Z87.19 PERSONAL HISTORY OF OTHER DISEASES OF THE DIGESTIVE SYSTEM: Chronic | Status: ACTIVE | Noted: 2024-04-10

## 2024-05-07 PROBLEM — M10.9 GOUT, UNSPECIFIED: Chronic | Status: ACTIVE | Noted: 2024-04-10

## 2024-05-07 PROBLEM — Z87.438 PERSONAL HISTORY OF OTHER DISEASES OF MALE GENITAL ORGANS: Chronic | Status: ACTIVE | Noted: 2024-04-10

## 2024-05-07 PROBLEM — K57.90 DIVERTICULOSIS OF INTESTINE, PART UNSPECIFIED, WITHOUT PERFORATION OR ABSCESS WITHOUT BLEEDING: Chronic | Status: ACTIVE | Noted: 2024-04-10

## 2025-01-23 NOTE — SBIRT NOTE ADULT - NSSBIRTOFTENALCOHOL_GEN_A_CORE
Assumed care of patient att. Patient resting on stretcher, side rails x 2, on monitor x 2. Patient is on 2L NC, A&Ox4. Patient reporting increased coughing, requesting medication to help. He reports this has been ongoing all day with no relief.    This RN placed the patient on cardiac monitor x 3, NSR att. VSS, assisted patient to comfortable position in bed. Continuous fluids started. Wife at bedside.     Patient requesting soup, previous RN put in a request to cafeteria.   
2 or 3 times a week

## 2025-03-11 ENCOUNTER — NON-APPOINTMENT (OUTPATIENT)
Age: 85
End: 2025-03-11

## 2025-04-16 ENCOUNTER — NON-APPOINTMENT (OUTPATIENT)
Age: 85
End: 2025-04-16

## 2025-05-27 ENCOUNTER — NON-APPOINTMENT (OUTPATIENT)
Age: 85
End: 2025-05-27

## 2025-05-27 ENCOUNTER — APPOINTMENT (OUTPATIENT)
Dept: NEUROLOGY | Facility: CLINIC | Age: 85
End: 2025-05-27
Payer: MEDICARE

## 2025-05-27 VITALS
HEART RATE: 68 BPM | HEIGHT: 68 IN | SYSTOLIC BLOOD PRESSURE: 115 MMHG | DIASTOLIC BLOOD PRESSURE: 54 MMHG | WEIGHT: 155 LBS | BODY MASS INDEX: 23.49 KG/M2

## 2025-05-27 DIAGNOSIS — F41.9 ANXIETY DISORDER, UNSPECIFIED: ICD-10-CM

## 2025-05-27 DIAGNOSIS — G31.84 MILD COGNITIVE IMPAIRMENT, SO STATED: ICD-10-CM

## 2025-05-27 PROCEDURE — 99214 OFFICE O/P EST MOD 30 MIN: CPT

## 2025-05-27 RX ORDER — DONEPEZIL HYDROCHLORIDE 10 MG/1
10 TABLET ORAL DAILY
Qty: 30 | Refills: 2 | Status: ACTIVE | COMMUNITY
Start: 2025-05-27 | End: 1900-01-01